# Patient Record
Sex: FEMALE | Race: WHITE | Employment: OTHER | URBAN - METROPOLITAN AREA
[De-identification: names, ages, dates, MRNs, and addresses within clinical notes are randomized per-mention and may not be internally consistent; named-entity substitution may affect disease eponyms.]

---

## 2017-01-05 ENCOUNTER — OFFICE VISIT (OUTPATIENT)
Dept: ENDOCRINOLOGY | Age: 67
End: 2017-01-05

## 2017-01-05 VITALS
DIASTOLIC BLOOD PRESSURE: 68 MMHG | WEIGHT: 269.6 LBS | RESPIRATION RATE: 16 BRPM | HEIGHT: 65 IN | HEART RATE: 72 BPM | BODY MASS INDEX: 44.92 KG/M2 | SYSTOLIC BLOOD PRESSURE: 130 MMHG

## 2017-01-05 DIAGNOSIS — I10 ESSENTIAL HYPERTENSION: Chronic | ICD-10-CM

## 2017-01-05 DIAGNOSIS — E78.49 OTHER HYPERLIPIDEMIA: ICD-10-CM

## 2017-01-05 PROCEDURE — 99214 OFFICE O/P EST MOD 30 MIN: CPT | Performed by: INTERNAL MEDICINE

## 2017-01-05 RX ORDER — GABAPENTIN 300 MG/1
CAPSULE ORAL
Qty: 30 CAPSULE | Refills: 3 | Status: SHIPPED | OUTPATIENT
Start: 2017-01-05 | End: 2017-04-13

## 2017-01-12 RX ORDER — BLOOD SUGAR DIAGNOSTIC
STRIP MISCELLANEOUS
Qty: 200 STRIP | Refills: 5 | Status: SHIPPED | OUTPATIENT
Start: 2017-01-12 | End: 2019-10-28 | Stop reason: SDUPTHER

## 2017-01-14 LAB
ESTIMATED AVERAGE GLUCOSE: 162.8 MG/DL
HBA1C MFR BLD: 7.3 %

## 2017-01-30 ENCOUNTER — OFFICE VISIT (OUTPATIENT)
Dept: INTERNAL MEDICINE CLINIC | Age: 67
End: 2017-01-30

## 2017-01-30 VITALS
RESPIRATION RATE: 16 BRPM | SYSTOLIC BLOOD PRESSURE: 132 MMHG | BODY MASS INDEX: 45.32 KG/M2 | DIASTOLIC BLOOD PRESSURE: 78 MMHG | WEIGHT: 272 LBS | HEIGHT: 65 IN | HEART RATE: 84 BPM | OXYGEN SATURATION: 93 %

## 2017-01-30 DIAGNOSIS — K57.92 ACUTE DIVERTICULITIS: Primary | ICD-10-CM

## 2017-01-30 PROCEDURE — 99214 OFFICE O/P EST MOD 30 MIN: CPT | Performed by: INTERNAL MEDICINE

## 2017-01-30 RX ORDER — METRONIDAZOLE 500 MG/1
500 TABLET ORAL 3 TIMES DAILY
Qty: 21 TABLET | Refills: 0 | Status: SHIPPED | OUTPATIENT
Start: 2017-01-30 | End: 2017-02-06

## 2017-01-30 RX ORDER — CIPROFLOXACIN 250 MG/1
250 TABLET, FILM COATED ORAL 2 TIMES DAILY
Qty: 14 TABLET | Refills: 0 | Status: SHIPPED | OUTPATIENT
Start: 2017-01-30 | End: 2017-02-06

## 2017-02-03 ENCOUNTER — TELEPHONE (OUTPATIENT)
Dept: INTERNAL MEDICINE CLINIC | Age: 67
End: 2017-02-03

## 2017-02-13 RX ORDER — SPIRONOLACTONE 25 MG/1
TABLET ORAL
Qty: 90 TABLET | Refills: 3 | Status: SHIPPED | OUTPATIENT
Start: 2017-02-13 | End: 2018-02-08 | Stop reason: SDUPTHER

## 2017-04-13 ENCOUNTER — OFFICE VISIT (OUTPATIENT)
Dept: ENDOCRINOLOGY | Age: 67
End: 2017-04-13

## 2017-04-13 VITALS
HEIGHT: 65 IN | RESPIRATION RATE: 16 BRPM | BODY MASS INDEX: 45.55 KG/M2 | WEIGHT: 273.4 LBS | OXYGEN SATURATION: 88 % | DIASTOLIC BLOOD PRESSURE: 76 MMHG | SYSTOLIC BLOOD PRESSURE: 133 MMHG | HEART RATE: 98 BPM

## 2017-04-13 DIAGNOSIS — I10 ESSENTIAL HYPERTENSION: Chronic | ICD-10-CM

## 2017-04-13 DIAGNOSIS — E78.49 OTHER HYPERLIPIDEMIA: ICD-10-CM

## 2017-04-13 LAB — HBA1C MFR BLD: 8.1 %

## 2017-04-13 PROCEDURE — 83036 HEMOGLOBIN GLYCOSYLATED A1C: CPT | Performed by: INTERNAL MEDICINE

## 2017-04-13 PROCEDURE — 99214 OFFICE O/P EST MOD 30 MIN: CPT | Performed by: INTERNAL MEDICINE

## 2017-05-08 RX ORDER — AMLODIPINE BESYLATE 5 MG/1
TABLET ORAL
Qty: 180 TABLET | Refills: 3 | Status: SHIPPED | OUTPATIENT
Start: 2017-05-08 | End: 2018-02-08 | Stop reason: SDUPTHER

## 2017-06-19 RX ORDER — SIMVASTATIN 20 MG
TABLET ORAL
Qty: 90 TABLET | Refills: 3 | Status: SHIPPED | OUTPATIENT
Start: 2017-06-19 | End: 2018-06-03 | Stop reason: SDUPTHER

## 2017-07-31 RX ORDER — INSULIN DETEMIR 100 [IU]/ML
INJECTION, SOLUTION SUBCUTANEOUS
Qty: 45 ML | Refills: 3 | Status: SHIPPED | OUTPATIENT
Start: 2017-07-31 | End: 2017-12-12

## 2017-08-03 ENCOUNTER — OFFICE VISIT (OUTPATIENT)
Dept: ENDOCRINOLOGY | Age: 67
End: 2017-08-03

## 2017-08-03 VITALS
DIASTOLIC BLOOD PRESSURE: 62 MMHG | BODY MASS INDEX: 41.42 KG/M2 | WEIGHT: 248.6 LBS | SYSTOLIC BLOOD PRESSURE: 128 MMHG | RESPIRATION RATE: 16 BRPM | HEIGHT: 65 IN | HEART RATE: 78 BPM

## 2017-08-03 DIAGNOSIS — I10 ESSENTIAL HYPERTENSION: Chronic | ICD-10-CM

## 2017-08-03 LAB — HBA1C MFR BLD: 5.5 %

## 2017-08-03 PROCEDURE — 99214 OFFICE O/P EST MOD 30 MIN: CPT | Performed by: INTERNAL MEDICINE

## 2017-08-03 PROCEDURE — 83036 HEMOGLOBIN GLYCOSYLATED A1C: CPT | Performed by: INTERNAL MEDICINE

## 2017-08-07 RX ORDER — ALLOPURINOL 100 MG/1
TABLET ORAL
Qty: 180 TABLET | Refills: 3 | Status: SHIPPED | OUTPATIENT
Start: 2017-08-07 | End: 2017-12-12

## 2017-08-10 ENCOUNTER — OFFICE VISIT (OUTPATIENT)
Dept: INTERNAL MEDICINE CLINIC | Age: 67
End: 2017-08-10

## 2017-08-10 VITALS
RESPIRATION RATE: 16 BRPM | BODY MASS INDEX: 41.32 KG/M2 | SYSTOLIC BLOOD PRESSURE: 108 MMHG | TEMPERATURE: 97.9 F | WEIGHT: 248 LBS | HEIGHT: 65 IN | DIASTOLIC BLOOD PRESSURE: 62 MMHG

## 2017-08-10 DIAGNOSIS — K57.92 ACUTE DIVERTICULITIS: Primary | ICD-10-CM

## 2017-08-10 PROCEDURE — 99214 OFFICE O/P EST MOD 30 MIN: CPT | Performed by: INTERNAL MEDICINE

## 2017-08-10 RX ORDER — METRONIDAZOLE 500 MG/1
500 TABLET ORAL 3 TIMES DAILY
Qty: 21 TABLET | Refills: 0 | Status: SHIPPED | OUTPATIENT
Start: 2017-08-10 | End: 2017-08-17

## 2017-08-10 RX ORDER — CIPROFLOXACIN 250 MG/1
250 TABLET, FILM COATED ORAL 2 TIMES DAILY
Qty: 14 TABLET | Refills: 0 | Status: SHIPPED | OUTPATIENT
Start: 2017-08-10 | End: 2017-08-17

## 2017-08-14 RX ORDER — GEMFIBROZIL 600 MG/1
TABLET, FILM COATED ORAL
Qty: 180 TABLET | Refills: 3 | Status: SHIPPED | OUTPATIENT
Start: 2017-08-14 | End: 2018-02-08 | Stop reason: SDUPTHER

## 2017-09-05 RX ORDER — LISINOPRIL 20 MG/1
TABLET ORAL
Qty: 180 TABLET | Refills: 3 | Status: SHIPPED | OUTPATIENT
Start: 2017-09-05 | End: 2018-09-05 | Stop reason: SDUPTHER

## 2017-09-05 RX ORDER — METFORMIN HYDROCHLORIDE 500 MG/1
TABLET, EXTENDED RELEASE ORAL
Qty: 360 TABLET | Refills: 3 | Status: SHIPPED | OUTPATIENT
Start: 2017-09-05 | End: 2018-02-08 | Stop reason: SDUPTHER

## 2017-09-22 LAB
CREATININE URINE: 48.7 MG/DL (ref 28–259)
MICROALBUMIN UR-MCNC: 1.7 MG/DL
MICROALBUMIN/CREAT UR-RTO: 34.9 MG/G (ref 0–30)

## 2017-11-01 ENCOUNTER — NURSE ONLY (OUTPATIENT)
Dept: INTERNAL MEDICINE CLINIC | Age: 67
End: 2017-11-01

## 2017-11-01 PROCEDURE — 90662 IIV NO PRSV INCREASED AG IM: CPT | Performed by: INTERNAL MEDICINE

## 2017-11-01 PROCEDURE — 90471 IMMUNIZATION ADMIN: CPT | Performed by: INTERNAL MEDICINE

## 2017-11-01 NOTE — PROGRESS NOTES
Vaccine Information Sheet, \"Influenza - Inactivated\"  given to Keo Gomez, or parent/legal guardian of  Keo Gomez and verbalized understanding. Patient responses:    Have you ever had a reaction to a flu vaccine? No  Are you able to eat eggs without adverse effects? Yes  Do you have any current illness? No  Have you ever had Guillian Menno Syndrome? No    Flu vaccine given per order. Please see immunization tab.

## 2017-12-05 RX ORDER — FUROSEMIDE 80 MG
TABLET ORAL
Qty: 180 TABLET | Refills: 0 | Status: SHIPPED | OUTPATIENT
Start: 2017-12-05 | End: 2017-12-12

## 2017-12-05 RX ORDER — INSULIN DETEMIR 100 [IU]/ML
INJECTION, SOLUTION SUBCUTANEOUS
Qty: 90 ML | Refills: 5 | Status: SHIPPED | OUTPATIENT
Start: 2017-12-05 | End: 2017-12-12

## 2017-12-06 ENCOUNTER — TELEPHONE (OUTPATIENT)
Dept: INTERNAL MEDICINE CLINIC | Age: 67
End: 2017-12-06

## 2017-12-07 ENCOUNTER — OFFICE VISIT (OUTPATIENT)
Dept: ENDOCRINOLOGY | Age: 67
End: 2017-12-07

## 2017-12-07 VITALS
HEART RATE: 84 BPM | BODY MASS INDEX: 40.2 KG/M2 | DIASTOLIC BLOOD PRESSURE: 70 MMHG | WEIGHT: 241.6 LBS | SYSTOLIC BLOOD PRESSURE: 128 MMHG

## 2017-12-07 DIAGNOSIS — E11.9 TYPE 2 DIABETES MELLITUS WITHOUT COMPLICATION, WITH LONG-TERM CURRENT USE OF INSULIN (HCC): Primary | Chronic | ICD-10-CM

## 2017-12-07 DIAGNOSIS — I10 ESSENTIAL HYPERTENSION: Chronic | ICD-10-CM

## 2017-12-07 DIAGNOSIS — Z79.4 TYPE 2 DIABETES MELLITUS WITHOUT COMPLICATION, WITH LONG-TERM CURRENT USE OF INSULIN (HCC): Primary | Chronic | ICD-10-CM

## 2017-12-07 PROCEDURE — 3017F COLORECTAL CA SCREEN DOC REV: CPT | Performed by: INTERNAL MEDICINE

## 2017-12-07 PROCEDURE — G8400 PT W/DXA NO RESULTS DOC: HCPCS | Performed by: INTERNAL MEDICINE

## 2017-12-07 PROCEDURE — G8427 DOCREV CUR MEDS BY ELIG CLIN: HCPCS | Performed by: INTERNAL MEDICINE

## 2017-12-07 PROCEDURE — G8417 CALC BMI ABV UP PARAM F/U: HCPCS | Performed by: INTERNAL MEDICINE

## 2017-12-07 PROCEDURE — 3044F HG A1C LEVEL LT 7.0%: CPT | Performed by: INTERNAL MEDICINE

## 2017-12-07 PROCEDURE — 1090F PRES/ABSN URINE INCON ASSESS: CPT | Performed by: INTERNAL MEDICINE

## 2017-12-07 PROCEDURE — 3014F SCREEN MAMMO DOC REV: CPT | Performed by: INTERNAL MEDICINE

## 2017-12-07 PROCEDURE — 99214 OFFICE O/P EST MOD 30 MIN: CPT | Performed by: INTERNAL MEDICINE

## 2017-12-07 PROCEDURE — 1036F TOBACCO NON-USER: CPT | Performed by: INTERNAL MEDICINE

## 2017-12-07 PROCEDURE — G8484 FLU IMMUNIZE NO ADMIN: HCPCS | Performed by: INTERNAL MEDICINE

## 2017-12-07 PROCEDURE — 1123F ACP DISCUSS/DSCN MKR DOCD: CPT | Performed by: INTERNAL MEDICINE

## 2017-12-07 PROCEDURE — 4040F PNEUMOC VAC/ADMIN/RCVD: CPT | Performed by: INTERNAL MEDICINE

## 2017-12-07 NOTE — PROGRESS NOTES
Seen as f/u patient for diabetes     Interim; Working on weight loss    Diagnosed with Type 2 diabetes mellitus > 5   years  Known diabetic complications: none     Current diabetic medications   Levemir 58 units BID  Metformin ER 1gm BID    Intolerance to diabetes medications: yes - Metformin -bad taste in mouth    Last A1c 5.5%<------- 8.1 on 4/17<------6.9 on 7/16<------- 6.9 on 4/16<-----5.9 on 1/16<----- 6.4 on 10/15<------ 6.9 on 6/15<-----5.9 on 2/15<----7.4 on 10/13<---- 8.8 on 5/13<---9.2<---9.6    Prior visit with dietician: No  Current diet: on average, 3 meals per day trying to watch  Current exercise: walking   Current monitoring regimen: home blood tests - 1-2 times daily     Has brought blood glucose log/meter:yes  Home blood sugar records:   Any episodes of hypoglycemia no    No Hx of CAD , PVD, CVA    Hyperlipidemia:simvastatin 20mg  LDL 74 on 3/14  Gemfibrozil 600 BID  Last eye exam: 1/14  Last foot exam: 1/17  Last microalbumin to creatinine ratio: 53.8 on 6/14---> 9/14 nl, 2/16 nl---> 34.9 on 9/17  norvasc 5 BID, lisinopril 20mg aldactone 25 clonidine 0.1 BID     Review of Systems  Constitutional: 7 lb weight loss and + malaise/fatigue. Negative for fever and chills.   See HPI       Objective:        /70   Pulse 84   Wt 241 lb 9.6 oz (109.6 kg)   BMI 40.20 kg/m²   Wt Readings from Last 3 Encounters:   12/07/17 241 lb 9.6 oz (109.6 kg)   08/10/17 248 lb (112.5 kg)   08/03/17 248 lb 9.6 oz (112.8 kg)     Constitutional: Well-developed, alert, appears stated age, cooperative, in no acute distress  H/E/N/M/T:atraumatic, normocephalic, external ears, nose, lips normal without lesions  Skeletal muscular: no kyphosis, no gross abnormalities  Skin: Xanthoma/Xanthelasmas are  not present  Psychiatric: Judgement and Insight:  judgement and insight appear normal  Neuro: Normal without focal findings, speech is spontaneous      1/17  Skeletal foot exam is normal, no skin lesions, toenails are

## 2017-12-12 ENCOUNTER — OFFICE VISIT (OUTPATIENT)
Dept: INTERNAL MEDICINE CLINIC | Age: 67
End: 2017-12-12

## 2017-12-12 VITALS
HEIGHT: 65 IN | OXYGEN SATURATION: 97 % | BODY MASS INDEX: 40.19 KG/M2 | DIASTOLIC BLOOD PRESSURE: 74 MMHG | WEIGHT: 241.2 LBS | RESPIRATION RATE: 16 BRPM | SYSTOLIC BLOOD PRESSURE: 128 MMHG | HEART RATE: 74 BPM

## 2017-12-12 DIAGNOSIS — E78.2 MIXED HYPERLIPIDEMIA: ICD-10-CM

## 2017-12-12 DIAGNOSIS — I10 ESSENTIAL HYPERTENSION: Chronic | ICD-10-CM

## 2017-12-12 DIAGNOSIS — E11.40 CONTROLLED TYPE 2 DIABETES MELLITUS WITH NEUROPATHY (HCC): Primary | ICD-10-CM

## 2017-12-12 PROCEDURE — G8400 PT W/DXA NO RESULTS DOC: HCPCS | Performed by: INTERNAL MEDICINE

## 2017-12-12 PROCEDURE — 4040F PNEUMOC VAC/ADMIN/RCVD: CPT | Performed by: INTERNAL MEDICINE

## 2017-12-12 PROCEDURE — 3017F COLORECTAL CA SCREEN DOC REV: CPT | Performed by: INTERNAL MEDICINE

## 2017-12-12 PROCEDURE — 3014F SCREEN MAMMO DOC REV: CPT | Performed by: INTERNAL MEDICINE

## 2017-12-12 PROCEDURE — 1123F ACP DISCUSS/DSCN MKR DOCD: CPT | Performed by: INTERNAL MEDICINE

## 2017-12-12 PROCEDURE — G8417 CALC BMI ABV UP PARAM F/U: HCPCS | Performed by: INTERNAL MEDICINE

## 2017-12-12 PROCEDURE — 1090F PRES/ABSN URINE INCON ASSESS: CPT | Performed by: INTERNAL MEDICINE

## 2017-12-12 PROCEDURE — 3044F HG A1C LEVEL LT 7.0%: CPT | Performed by: INTERNAL MEDICINE

## 2017-12-12 PROCEDURE — G8484 FLU IMMUNIZE NO ADMIN: HCPCS | Performed by: INTERNAL MEDICINE

## 2017-12-12 PROCEDURE — 90471 IMMUNIZATION ADMIN: CPT | Performed by: INTERNAL MEDICINE

## 2017-12-12 PROCEDURE — G8427 DOCREV CUR MEDS BY ELIG CLIN: HCPCS | Performed by: INTERNAL MEDICINE

## 2017-12-12 PROCEDURE — 99214 OFFICE O/P EST MOD 30 MIN: CPT | Performed by: INTERNAL MEDICINE

## 2017-12-12 PROCEDURE — 1036F TOBACCO NON-USER: CPT | Performed by: INTERNAL MEDICINE

## 2017-12-12 PROCEDURE — 90670 PCV13 VACCINE IM: CPT | Performed by: INTERNAL MEDICINE

## 2017-12-12 ASSESSMENT — PATIENT HEALTH QUESTIONNAIRE - PHQ9
2. FEELING DOWN, DEPRESSED OR HOPELESS: 0
1. LITTLE INTEREST OR PLEASURE IN DOING THINGS: 0
SUM OF ALL RESPONSES TO PHQ9 QUESTIONS 1 & 2: 0
SUM OF ALL RESPONSES TO PHQ QUESTIONS 1-9: 0

## 2017-12-12 ASSESSMENT — ENCOUNTER SYMPTOMS
RESPIRATORY NEGATIVE: 1
GASTROINTESTINAL NEGATIVE: 1

## 2017-12-22 DIAGNOSIS — E11.9 TYPE 2 DIABETES MELLITUS WITHOUT COMPLICATION, WITH LONG-TERM CURRENT USE OF INSULIN (HCC): Chronic | ICD-10-CM

## 2017-12-22 DIAGNOSIS — Z79.4 TYPE 2 DIABETES MELLITUS WITHOUT COMPLICATION, WITH LONG-TERM CURRENT USE OF INSULIN (HCC): Chronic | ICD-10-CM

## 2017-12-22 LAB
ESTIMATED AVERAGE GLUCOSE: 114 MG/DL
HBA1C MFR BLD: 5.6 %

## 2018-02-08 RX ORDER — METFORMIN HYDROCHLORIDE 500 MG/1
TABLET, EXTENDED RELEASE ORAL
Qty: 360 TABLET | Refills: 0 | Status: SHIPPED | OUTPATIENT
Start: 2018-02-08 | End: 2018-11-16 | Stop reason: SDUPTHER

## 2018-02-08 RX ORDER — SPIRONOLACTONE 25 MG/1
TABLET ORAL
Qty: 90 TABLET | Refills: 0 | Status: SHIPPED | OUTPATIENT
Start: 2018-02-08 | End: 2018-05-21 | Stop reason: SDUPTHER

## 2018-02-08 RX ORDER — AMLODIPINE BESYLATE 5 MG/1
TABLET ORAL
Qty: 180 TABLET | Refills: 0 | Status: SHIPPED | OUTPATIENT
Start: 2018-02-08 | End: 2018-05-14 | Stop reason: SDUPTHER

## 2018-02-08 RX ORDER — GEMFIBROZIL 600 MG/1
TABLET, FILM COATED ORAL
Qty: 180 TABLET | Refills: 0 | Status: SHIPPED | OUTPATIENT
Start: 2018-02-08 | End: 2018-11-07 | Stop reason: SDUPTHER

## 2018-03-09 RX ORDER — CLONIDINE HYDROCHLORIDE 0.1 MG/1
TABLET ORAL
Qty: 180 TABLET | Refills: 0 | Status: SHIPPED | OUTPATIENT
Start: 2018-03-09 | End: 2018-06-02 | Stop reason: SDUPTHER

## 2018-03-12 ENCOUNTER — TELEPHONE (OUTPATIENT)
Dept: INTERNAL MEDICINE CLINIC | Age: 68
End: 2018-03-12

## 2018-03-13 NOTE — TELEPHONE ENCOUNTER
Pt wants to know if Dr. Freya Moyer sent in a script to [facility]. Please call Rick

## 2018-03-14 NOTE — TELEPHONE ENCOUNTER
Please call her. Is this weakness related to joint pain or what? He doesn't realize how complex the insurance makes this. He feels that all we need to do is say she needs one and that makes it happen. Much more difficult than that and it's why we need to be precise. She may even need an appointment to discuss.

## 2018-03-14 NOTE — TELEPHONE ENCOUNTER
Spoke with Nargis Huber and she states she already has scooter (in the trunk of her car). Pt purchased the scooter, but is now getting billed for the taxes. Pt states that she can have a tax refund if a medical recommendation is obtained. Pt would like a recommendation from Dr Whit Cannon saying that she would benefit from the scooter since she has Middle/Low back pain (states she has discussed this with Dr Whit Cannon multiple times.)Pt states she has trouble walking and trouble standing for more than 5 minutes.

## 2018-04-12 ENCOUNTER — OFFICE VISIT (OUTPATIENT)
Dept: ENDOCRINOLOGY | Age: 68
End: 2018-04-12

## 2018-04-12 VITALS
BODY MASS INDEX: 43.28 KG/M2 | OXYGEN SATURATION: 93 % | DIASTOLIC BLOOD PRESSURE: 62 MMHG | HEART RATE: 78 BPM | RESPIRATION RATE: 16 BRPM | WEIGHT: 259.8 LBS | SYSTOLIC BLOOD PRESSURE: 130 MMHG | HEIGHT: 65 IN

## 2018-04-12 DIAGNOSIS — I10 ESSENTIAL HYPERTENSION: Chronic | ICD-10-CM

## 2018-04-12 DIAGNOSIS — E11.40 CONTROLLED TYPE 2 DIABETES MELLITUS WITH NEUROPATHY (HCC): Primary | ICD-10-CM

## 2018-04-12 LAB — HBA1C MFR BLD: 6.3 %

## 2018-04-12 PROCEDURE — 83036 HEMOGLOBIN GLYCOSYLATED A1C: CPT | Performed by: INTERNAL MEDICINE

## 2018-04-12 PROCEDURE — 99213 OFFICE O/P EST LOW 20 MIN: CPT | Performed by: INTERNAL MEDICINE

## 2018-05-14 RX ORDER — AMLODIPINE BESYLATE 5 MG/1
TABLET ORAL
Qty: 180 TABLET | Refills: 0 | Status: SHIPPED | OUTPATIENT
Start: 2018-05-14 | End: 2018-08-10 | Stop reason: SDUPTHER

## 2018-05-22 ENCOUNTER — TELEPHONE (OUTPATIENT)
Dept: INTERNAL MEDICINE CLINIC | Age: 68
End: 2018-05-22

## 2018-05-22 RX ORDER — SPIRONOLACTONE 25 MG/1
TABLET ORAL
Qty: 90 TABLET | Refills: 3 | Status: SHIPPED | OUTPATIENT
Start: 2018-05-22 | End: 2018-11-12 | Stop reason: SDUPTHER

## 2018-05-25 ENCOUNTER — NURSE ONLY (OUTPATIENT)
Dept: INTERNAL MEDICINE CLINIC | Age: 68
End: 2018-05-25

## 2018-05-25 DIAGNOSIS — I10 ESSENTIAL HYPERTENSION: Primary | Chronic | ICD-10-CM

## 2018-05-25 LAB
ANION GAP SERPL CALCULATED.3IONS-SCNC: 18 MMOL/L (ref 3–16)
BUN BLDV-MCNC: 53 MG/DL (ref 7–20)
CALCIUM SERPL-MCNC: 10.2 MG/DL (ref 8.3–10.6)
CHLORIDE BLD-SCNC: 106 MMOL/L (ref 99–110)
CO2: 22 MMOL/L (ref 21–32)
CREAT SERPL-MCNC: 1.1 MG/DL (ref 0.6–1.2)
GFR AFRICAN AMERICAN: >60
GFR NON-AFRICAN AMERICAN: 50
GLUCOSE BLD-MCNC: 148 MG/DL (ref 70–99)
POTASSIUM SERPL-SCNC: 4.9 MMOL/L (ref 3.5–5.1)
SODIUM BLD-SCNC: 146 MMOL/L (ref 136–145)

## 2018-05-25 PROCEDURE — 36415 COLL VENOUS BLD VENIPUNCTURE: CPT | Performed by: INTERNAL MEDICINE

## 2018-05-30 ENCOUNTER — TELEPHONE (OUTPATIENT)
Dept: INTERNAL MEDICINE CLINIC | Age: 68
End: 2018-05-30

## 2018-05-31 RX ORDER — FUROSEMIDE 20 MG/1
20 TABLET ORAL DAILY
Qty: 30 TABLET | Refills: 2 | Status: SHIPPED | OUTPATIENT
Start: 2018-05-31 | End: 2018-08-24 | Stop reason: SDUPTHER

## 2018-06-04 RX ORDER — CLONIDINE HYDROCHLORIDE 0.1 MG/1
TABLET ORAL
Qty: 180 TABLET | Refills: 3 | Status: SHIPPED | OUTPATIENT
Start: 2018-06-04 | End: 2019-06-11 | Stop reason: SDUPTHER

## 2018-06-04 RX ORDER — SIMVASTATIN 20 MG
TABLET ORAL
Qty: 90 TABLET | Refills: 3 | Status: SHIPPED | OUTPATIENT
Start: 2018-06-04 | End: 2018-11-29 | Stop reason: SDUPTHER

## 2018-08-09 RX ORDER — ALLOPURINOL 100 MG/1
TABLET ORAL
Qty: 180 TABLET | Refills: 3 | Status: SHIPPED | OUTPATIENT
Start: 2018-08-09 | End: 2019-08-05 | Stop reason: SDUPTHER

## 2018-08-10 RX ORDER — AMLODIPINE BESYLATE 5 MG/1
TABLET ORAL
Qty: 180 TABLET | Refills: 3 | Status: SHIPPED | OUTPATIENT
Start: 2018-08-10 | End: 2019-08-05 | Stop reason: SDUPTHER

## 2018-08-16 ENCOUNTER — OFFICE VISIT (OUTPATIENT)
Dept: ENDOCRINOLOGY | Age: 68
End: 2018-08-16

## 2018-08-16 VITALS
SYSTOLIC BLOOD PRESSURE: 128 MMHG | BODY MASS INDEX: 43.95 KG/M2 | WEIGHT: 263.8 LBS | HEIGHT: 65 IN | DIASTOLIC BLOOD PRESSURE: 78 MMHG

## 2018-08-16 DIAGNOSIS — I10 ESSENTIAL HYPERTENSION: Chronic | ICD-10-CM

## 2018-08-16 DIAGNOSIS — R80.9 MICROALBUMINURIA: ICD-10-CM

## 2018-08-16 DIAGNOSIS — E11.40 CONTROLLED TYPE 2 DIABETES MELLITUS WITH NEUROPATHY (HCC): Primary | ICD-10-CM

## 2018-08-16 LAB — HBA1C MFR BLD: 7.2 %

## 2018-08-16 PROCEDURE — 1123F ACP DISCUSS/DSCN MKR DOCD: CPT | Performed by: INTERNAL MEDICINE

## 2018-08-16 PROCEDURE — 1101F PT FALLS ASSESS-DOCD LE1/YR: CPT | Performed by: INTERNAL MEDICINE

## 2018-08-16 PROCEDURE — 3045F PR MOST RECENT HEMOGLOBIN A1C LEVEL 7.0-9.0%: CPT | Performed by: INTERNAL MEDICINE

## 2018-08-16 PROCEDURE — 1090F PRES/ABSN URINE INCON ASSESS: CPT | Performed by: INTERNAL MEDICINE

## 2018-08-16 PROCEDURE — 2022F DILAT RTA XM EVC RTNOPTHY: CPT | Performed by: INTERNAL MEDICINE

## 2018-08-16 PROCEDURE — 4040F PNEUMOC VAC/ADMIN/RCVD: CPT | Performed by: INTERNAL MEDICINE

## 2018-08-16 PROCEDURE — 3017F COLORECTAL CA SCREEN DOC REV: CPT | Performed by: INTERNAL MEDICINE

## 2018-08-16 PROCEDURE — G8400 PT W/DXA NO RESULTS DOC: HCPCS | Performed by: INTERNAL MEDICINE

## 2018-08-16 PROCEDURE — 99214 OFFICE O/P EST MOD 30 MIN: CPT | Performed by: INTERNAL MEDICINE

## 2018-08-16 PROCEDURE — G8417 CALC BMI ABV UP PARAM F/U: HCPCS | Performed by: INTERNAL MEDICINE

## 2018-08-16 PROCEDURE — 83036 HEMOGLOBIN GLYCOSYLATED A1C: CPT | Performed by: INTERNAL MEDICINE

## 2018-08-16 PROCEDURE — G8427 DOCREV CUR MEDS BY ELIG CLIN: HCPCS | Performed by: INTERNAL MEDICINE

## 2018-08-16 PROCEDURE — 1036F TOBACCO NON-USER: CPT | Performed by: INTERNAL MEDICINE

## 2018-08-24 RX ORDER — FUROSEMIDE 20 MG/1
TABLET ORAL
Qty: 30 TABLET | Refills: 5 | Status: SHIPPED | OUTPATIENT
Start: 2018-08-24 | End: 2018-11-13 | Stop reason: SDUPTHER

## 2018-09-05 RX ORDER — LISINOPRIL 20 MG/1
TABLET ORAL
Qty: 180 TABLET | Refills: 0 | Status: SHIPPED | OUTPATIENT
Start: 2018-09-05 | End: 2018-12-02 | Stop reason: SDUPTHER

## 2018-09-28 ENCOUNTER — NURSE ONLY (OUTPATIENT)
Dept: INTERNAL MEDICINE CLINIC | Age: 68
End: 2018-09-28
Payer: MEDICARE

## 2018-09-28 PROCEDURE — G0008 ADMIN INFLUENZA VIRUS VAC: HCPCS | Performed by: INTERNAL MEDICINE

## 2018-09-28 PROCEDURE — 90662 IIV NO PRSV INCREASED AG IM: CPT | Performed by: INTERNAL MEDICINE

## 2018-09-28 NOTE — PROGRESS NOTES
Vaccine Information Sheet, \"Influenza - Inactivated\"  given to Erickson Jiménez, or parent/legal guardian of  Erickson Jiménez and verbalized understanding. Patient responses:    Have you ever had a reaction to a flu vaccine? No  Are you able to eat eggs without adverse effects? Yes  Do you have any current illness? No  Have you ever had Guillian Gaines Syndrome? No    Flu vaccine given per order. Please see immunization tab.

## 2018-11-07 RX ORDER — GEMFIBROZIL 600 MG/1
TABLET, FILM COATED ORAL
Qty: 180 TABLET | Refills: 3 | Status: SHIPPED | OUTPATIENT
Start: 2018-11-07 | End: 2019-11-01 | Stop reason: SDUPTHER

## 2018-11-12 RX ORDER — SPIRONOLACTONE 25 MG/1
TABLET ORAL
Qty: 90 TABLET | Refills: 3 | Status: SHIPPED | OUTPATIENT
Start: 2018-11-12 | End: 2020-06-09 | Stop reason: SDUPTHER

## 2018-11-13 RX ORDER — FUROSEMIDE 20 MG/1
TABLET ORAL
Qty: 30 TABLET | Refills: 5 | Status: SHIPPED | OUTPATIENT
Start: 2018-11-13 | End: 2019-07-20 | Stop reason: SDUPTHER

## 2018-11-16 RX ORDER — METFORMIN HYDROCHLORIDE 500 MG/1
TABLET, EXTENDED RELEASE ORAL
Qty: 360 TABLET | Refills: 1 | Status: SHIPPED | OUTPATIENT
Start: 2018-11-16 | End: 2019-05-25 | Stop reason: SDUPTHER

## 2018-11-29 RX ORDER — SIMVASTATIN 20 MG
TABLET ORAL
Qty: 90 TABLET | Refills: 1 | Status: SHIPPED | OUTPATIENT
Start: 2018-11-29 | End: 2019-12-18 | Stop reason: SDUPTHER

## 2018-12-20 ENCOUNTER — OFFICE VISIT (OUTPATIENT)
Dept: ENDOCRINOLOGY | Age: 68
End: 2018-12-20
Payer: MEDICARE

## 2018-12-20 VITALS
SYSTOLIC BLOOD PRESSURE: 142 MMHG | BODY MASS INDEX: 44.98 KG/M2 | RESPIRATION RATE: 16 BRPM | WEIGHT: 270 LBS | DIASTOLIC BLOOD PRESSURE: 78 MMHG | OXYGEN SATURATION: 91 % | HEIGHT: 65 IN | HEART RATE: 81 BPM

## 2018-12-20 DIAGNOSIS — E11.40 CONTROLLED TYPE 2 DIABETES MELLITUS WITH NEUROPATHY (HCC): Primary | ICD-10-CM

## 2018-12-20 DIAGNOSIS — I10 ESSENTIAL HYPERTENSION: Chronic | ICD-10-CM

## 2018-12-20 DIAGNOSIS — R80.9 MICROALBUMINURIA: ICD-10-CM

## 2018-12-20 LAB
CREATININE URINE: 19.3 MG/DL (ref 28–259)
HBA1C MFR BLD: 7 %
MICROALBUMIN UR-MCNC: 2.7 MG/DL
MICROALBUMIN/CREAT UR-RTO: 139.9 MG/G (ref 0–30)

## 2018-12-20 PROCEDURE — 2022F DILAT RTA XM EVC RTNOPTHY: CPT | Performed by: INTERNAL MEDICINE

## 2018-12-20 PROCEDURE — G8400 PT W/DXA NO RESULTS DOC: HCPCS | Performed by: INTERNAL MEDICINE

## 2018-12-20 PROCEDURE — G8417 CALC BMI ABV UP PARAM F/U: HCPCS | Performed by: INTERNAL MEDICINE

## 2018-12-20 PROCEDURE — 1090F PRES/ABSN URINE INCON ASSESS: CPT | Performed by: INTERNAL MEDICINE

## 2018-12-20 PROCEDURE — G8482 FLU IMMUNIZE ORDER/ADMIN: HCPCS | Performed by: INTERNAL MEDICINE

## 2018-12-20 PROCEDURE — G8427 DOCREV CUR MEDS BY ELIG CLIN: HCPCS | Performed by: INTERNAL MEDICINE

## 2018-12-20 PROCEDURE — 1036F TOBACCO NON-USER: CPT | Performed by: INTERNAL MEDICINE

## 2018-12-20 PROCEDURE — 1101F PT FALLS ASSESS-DOCD LE1/YR: CPT | Performed by: INTERNAL MEDICINE

## 2018-12-20 PROCEDURE — 3017F COLORECTAL CA SCREEN DOC REV: CPT | Performed by: INTERNAL MEDICINE

## 2018-12-20 PROCEDURE — 4040F PNEUMOC VAC/ADMIN/RCVD: CPT | Performed by: INTERNAL MEDICINE

## 2018-12-20 PROCEDURE — 3045F PR MOST RECENT HEMOGLOBIN A1C LEVEL 7.0-9.0%: CPT | Performed by: INTERNAL MEDICINE

## 2018-12-20 PROCEDURE — 83036 HEMOGLOBIN GLYCOSYLATED A1C: CPT | Performed by: INTERNAL MEDICINE

## 2018-12-20 PROCEDURE — 99214 OFFICE O/P EST MOD 30 MIN: CPT | Performed by: INTERNAL MEDICINE

## 2018-12-20 PROCEDURE — 1123F ACP DISCUSS/DSCN MKR DOCD: CPT | Performed by: INTERNAL MEDICINE

## 2018-12-20 NOTE — PROGRESS NOTES
speech is spontaneous  Chest: No labored breathing, no chest deformity, no stridor  Musculoskeletal: No joint deformity, swelling    4/18  Skeletal foot exam is normal, no skin lesions, toenails are normal,10 g monofilament is 5/10 on the right and 3/10 on the left    Lab Reviewed   No components found for: CHLPL  Lab Results   Component Value Date    TRIG 130 10/02/2013    TRIG 156 (H) 05/01/2012     Lab Results   Component Value Date    HDL 41 10/02/2013    HDL 47 05/01/2012     Lab Results   Component Value Date    LDLCALC 75 10/02/2013    LDLCALC 76 05/01/2012     Lab Results   Component Value Date    LABVLDL 26 10/02/2013    LABVLDL 31 05/01/2012     Lab Results   Component Value Date    LABA1C 7.2 08/16/2018       Assessment:     Willard Danielle is a 80 y.o. female with :    1.T2DM:Longstanding, well controlled, insulin resistant. She was eating more CHO,  advised low CHo diet, continue levemir,may add GLP-1 agonist , restrict CHO. A1c better  2. HTN:Repeat improved  3. HLD:last LDL at goal  4. Obesity  5. Microalbuminuria: recheck slightly elevated  6. Neuropathy: states has pain at night,  started low dose neurontin but had side effect, discussed lyrica/amitryptiline, would like to wait       Plan:      levemir 50 unit BID    switch to NPH if levemir too costly for patient    metformin ER 1000mg BID   Advise to check blood sugar 2-3 times a day   Patient to send blood sugar log for titration. Advise to exercise regularly but can not due to hip pain,Advise to low simple carbohydrate and protein with each  meal diet. 30gm with meal   Diabetes Care: recommend yearly eye exam, foot exam and urine microalbumin to   creatinine ratio.  Patient is up-to-date.       -Hyperlipidemia, LDL goal is <100 mg/dl   -Hypertension:Continue same  -Daily ASA:Takes  -Smoking status:Non smoker    4 months

## 2018-12-21 ENCOUNTER — TELEPHONE (OUTPATIENT)
Dept: ENDOCRINOLOGY | Age: 68
End: 2018-12-21

## 2018-12-24 RX ORDER — INSULIN DETEMIR 100 [IU]/ML
INJECTION, SOLUTION SUBCUTANEOUS
Qty: 30 PEN | Refills: 5 | Status: SHIPPED | OUTPATIENT
Start: 2018-12-24 | End: 2019-12-30

## 2019-01-09 ENCOUNTER — OFFICE VISIT (OUTPATIENT)
Dept: INTERNAL MEDICINE CLINIC | Age: 69
End: 2019-01-09
Payer: MEDICARE

## 2019-01-09 VITALS
OXYGEN SATURATION: 94 % | BODY MASS INDEX: 43.43 KG/M2 | HEART RATE: 63 BPM | WEIGHT: 261 LBS | SYSTOLIC BLOOD PRESSURE: 138 MMHG | DIASTOLIC BLOOD PRESSURE: 88 MMHG | TEMPERATURE: 99.2 F

## 2019-01-09 DIAGNOSIS — J40 BRONCHITIS: Primary | ICD-10-CM

## 2019-01-09 PROCEDURE — 1123F ACP DISCUSS/DSCN MKR DOCD: CPT | Performed by: INTERNAL MEDICINE

## 2019-01-09 PROCEDURE — 3017F COLORECTAL CA SCREEN DOC REV: CPT | Performed by: INTERNAL MEDICINE

## 2019-01-09 PROCEDURE — G8417 CALC BMI ABV UP PARAM F/U: HCPCS | Performed by: INTERNAL MEDICINE

## 2019-01-09 PROCEDURE — 1090F PRES/ABSN URINE INCON ASSESS: CPT | Performed by: INTERNAL MEDICINE

## 2019-01-09 PROCEDURE — 4040F PNEUMOC VAC/ADMIN/RCVD: CPT | Performed by: INTERNAL MEDICINE

## 2019-01-09 PROCEDURE — G8482 FLU IMMUNIZE ORDER/ADMIN: HCPCS | Performed by: INTERNAL MEDICINE

## 2019-01-09 PROCEDURE — 1101F PT FALLS ASSESS-DOCD LE1/YR: CPT | Performed by: INTERNAL MEDICINE

## 2019-01-09 PROCEDURE — 1036F TOBACCO NON-USER: CPT | Performed by: INTERNAL MEDICINE

## 2019-01-09 PROCEDURE — G8400 PT W/DXA NO RESULTS DOC: HCPCS | Performed by: INTERNAL MEDICINE

## 2019-01-09 PROCEDURE — 99213 OFFICE O/P EST LOW 20 MIN: CPT | Performed by: INTERNAL MEDICINE

## 2019-01-09 PROCEDURE — G8427 DOCREV CUR MEDS BY ELIG CLIN: HCPCS | Performed by: INTERNAL MEDICINE

## 2019-01-09 RX ORDER — AZITHROMYCIN 250 MG/1
TABLET, FILM COATED ORAL
Qty: 1 PACKET | Refills: 0 | Status: SHIPPED | OUTPATIENT
Start: 2019-01-09 | End: 2019-12-05 | Stop reason: ALTCHOICE

## 2019-01-09 RX ORDER — PROMETHAZINE HYDROCHLORIDE 6.25 MG/5ML
6.25 SYRUP ORAL 4 TIMES DAILY PRN
Qty: 120 ML | Refills: 1 | Status: SHIPPED | OUTPATIENT
Start: 2019-01-09 | End: 2019-01-16

## 2019-01-09 RX ORDER — CIPROFLOXACIN HYDROCHLORIDE 3.5 MG/ML
SOLUTION/ DROPS TOPICAL
Qty: 120 DROP | Refills: 0 | Status: SHIPPED | OUTPATIENT
Start: 2019-01-09 | End: 2019-03-18

## 2019-01-09 ASSESSMENT — PATIENT HEALTH QUESTIONNAIRE - PHQ9
SUM OF ALL RESPONSES TO PHQ QUESTIONS 1-9: 0
SUM OF ALL RESPONSES TO PHQ9 QUESTIONS 1 & 2: 0
1. LITTLE INTEREST OR PLEASURE IN DOING THINGS: 0
SUM OF ALL RESPONSES TO PHQ QUESTIONS 1-9: 0
2. FEELING DOWN, DEPRESSED OR HOPELESS: 0

## 2019-01-09 ASSESSMENT — ENCOUNTER SYMPTOMS
SORE THROAT: 1
CHOKING: 1
COUGH: 1
SHORTNESS OF BREATH: 0

## 2019-02-27 RX ORDER — LISINOPRIL 20 MG/1
TABLET ORAL
Qty: 180 TABLET | Refills: 1 | Status: SHIPPED | OUTPATIENT
Start: 2019-02-27 | End: 2019-08-24 | Stop reason: SDUPTHER

## 2019-03-18 ENCOUNTER — INITIAL CONSULT (OUTPATIENT)
Dept: SURGERY | Age: 69
End: 2019-03-18
Payer: MEDICARE

## 2019-03-18 VITALS
BODY MASS INDEX: 43.43 KG/M2 | OXYGEN SATURATION: 94 % | HEART RATE: 83 BPM | DIASTOLIC BLOOD PRESSURE: 74 MMHG | WEIGHT: 261 LBS | SYSTOLIC BLOOD PRESSURE: 130 MMHG

## 2019-03-18 DIAGNOSIS — L02.413 ABSCESS OF RIGHT SHOULDER: Primary | ICD-10-CM

## 2019-03-18 PROCEDURE — 10060 I&D ABSCESS SIMPLE/SINGLE: CPT | Performed by: SURGERY

## 2019-03-18 PROCEDURE — 99999 PR OFFICE/OUTPT VISIT,PROCEDURE ONLY: CPT | Performed by: SURGERY

## 2019-03-18 RX ORDER — SULFAMETHOXAZOLE AND TRIMETHOPRIM 800; 160 MG/1; MG/1
1 TABLET ORAL 2 TIMES DAILY
Qty: 28 TABLET | Refills: 0 | Status: SHIPPED | OUTPATIENT
Start: 2019-03-18 | End: 2019-04-01

## 2019-03-23 LAB
ANAEROBIC CULTURE: ABNORMAL
GRAM STAIN RESULT: ABNORMAL
ORGANISM: ABNORMAL
ORGANISM: ABNORMAL
WOUND/ABSCESS: ABNORMAL

## 2019-03-29 ENCOUNTER — OFFICE VISIT (OUTPATIENT)
Dept: SURGERY | Age: 69
End: 2019-03-29
Payer: MEDICARE

## 2019-03-29 VITALS
HEART RATE: 67 BPM | DIASTOLIC BLOOD PRESSURE: 72 MMHG | WEIGHT: 256 LBS | SYSTOLIC BLOOD PRESSURE: 133 MMHG | BODY MASS INDEX: 42.6 KG/M2

## 2019-03-29 DIAGNOSIS — L02.212 ABSCESS OF BACK: Primary | ICD-10-CM

## 2019-03-29 PROCEDURE — 99999 PR OFFICE/OUTPT VISIT,PROCEDURE ONLY: CPT | Performed by: SURGERY

## 2019-03-29 PROCEDURE — 10060 I&D ABSCESS SIMPLE/SINGLE: CPT | Performed by: SURGERY

## 2019-03-29 RX ORDER — CEPHALEXIN 500 MG/1
500 CAPSULE ORAL 4 TIMES DAILY
Qty: 40 CAPSULE | Refills: 0 | Status: SHIPPED | OUTPATIENT
Start: 2019-03-29 | End: 2019-04-08

## 2019-03-31 LAB
GRAM STAIN RESULT: ABNORMAL
WOUND/ABSCESS: ABNORMAL

## 2019-04-09 ENCOUNTER — OFFICE VISIT (OUTPATIENT)
Dept: SURGERY | Age: 69
End: 2019-04-09

## 2019-04-09 VITALS
OXYGEN SATURATION: 95 % | HEART RATE: 68 BPM | DIASTOLIC BLOOD PRESSURE: 76 MMHG | WEIGHT: 256 LBS | HEIGHT: 65 IN | RESPIRATION RATE: 18 BRPM | BODY MASS INDEX: 42.65 KG/M2 | SYSTOLIC BLOOD PRESSURE: 138 MMHG

## 2019-04-09 DIAGNOSIS — L02.212 ABSCESS OF BACK: Primary | ICD-10-CM

## 2019-04-09 DIAGNOSIS — L02.413 ABSCESS OF RIGHT SHOULDER: ICD-10-CM

## 2019-04-09 PROCEDURE — 99024 POSTOP FOLLOW-UP VISIT: CPT | Performed by: SURGERY

## 2019-04-23 ENCOUNTER — TELEPHONE (OUTPATIENT)
Dept: ENDOCRINOLOGY | Age: 69
End: 2019-04-23

## 2019-04-25 NOTE — PROGRESS NOTES
Post Operative Visit    [address]  [facility]   David Curiel MD    [address], [address]  [place]  Phone: [phone]  Fax: [phone]    Timothy Delgado   YOB: 1950    Date of Visit:  4/9/2019    No ref. provider found  Santos Dempsey MD    Subjective:     John Bruner presents for a two week follow-up s/p I&D of right shoulder abscess and I&D of left lower back abscess. Overall she has been doing well since her last visit. Wounds have minimal drainage  No fevers or chills. Pain is improving.      Pain Score:   3    No Known Allergies  Outpatient Medications Marked as Taking for the 4/9/19 encounter (Office Visit) with Alicia Alicea MD   Medication Sig Dispense Refill    lisinopril (PRINIVIL;ZESTRIL) 20 MG tablet TAKE 1 TABLET BY MOUTH TWICE A  tablet 1    azithromycin (ZITHROMAX) 250 MG tablet Take 2 tabs (500 mg) on Day 1, and take 1 tab (250 mg) on days 2 through 5. 1 packet 0    LEVEMIR FLEXTOUCH 100 UNIT/ML injection pen USE 70 UNITS IN THE AM AND 40 UNITS IN THE PM 30 pen 5    simvastatin (ZOCOR) 20 MG tablet TAKE 1 TABLET BY MOUTH EVERY DAY 90 tablet 1    metFORMIN (GLUCOPHAGE-XR) 500 MG extended release tablet TAKE 2 TABLETS BY MOUTH TWICE A  tablet 1    furosemide (LASIX) 20 MG tablet TAKE 1 TABLET BY MOUTH EVERY DAY 30 tablet 5    spironolactone (ALDACTONE) 25 MG tablet TAKE 1 TABLET BY MOUTH EVERY DAY 90 tablet 3    gemfibrozil (LOPID) 600 MG tablet TAKE 1 TABLET BY MOUTH TWICE A  tablet 3    amLODIPine (NORVASC) 5 MG tablet TAKE 1 TABLET BY MOUTH TWICE A  tablet 3    allopurinol (ZYLOPRIM) 100 MG tablet TAKE 1 TABLET BY MOUTH TWICE A  tablet 3    cloNIDine (CATAPRES) 0.1 MG tablet TAKE 1 TABLET BY MOUTH TWICE A  tablet 3    Insulin Pen Needle (BD PEN NEEDLE ELIZABETH U/F) 32G X 4 MM MISC Use 4 timesdaily 400 each 4    ONETOUCH VERIO strip USE AS DIRECTED TWICE DAILY AS NEEDED 200 strip 5    ONE TOUCH ULTRASOFT LANCETS MISC 1 each by Does not apply route 2 times daily 200 each 3    Multiple Vitamins-Minerals (CENTRUM SILVER) TABS Take  by mouth daily.  aspirin 81 MG chewable tablet Take 81 mg by mouth daily. Vitals:    04/09/19 1152   BP: 138/76   Site: Right Upper Arm   Position: Sitting   Cuff Size: Large Adult   Pulse: 68   Resp: 18   SpO2: 95%   Weight: 256 lb (116.1 kg)   Height: 5' 5\" (1.651 m)     Body mass index is 42.6 kg/m². Wt Readings from Last 3 Encounters:   04/09/19 256 lb (116.1 kg)   03/29/19 256 lb (116.1 kg)   03/18/19 261 lb (118.4 kg)     BP Readings from Last 3 Encounters:   04/09/19 138/76   03/29/19 133/72   03/18/19 130/74          Objective:    CONSTITUTIONAL:  awake, alert, no apparent distress    LUNGS:  Resp easy and unlabored  MUSCULOSKELETAL: No edema  NEUROLOGIC:  Mental Status Exam:  Level of Alertness:   awake  Orientation:   person, place, time  SKIN: right should wound decreasing in size, granulation at base; left lower back with minimal drainage, no surrounding erythema      Microbiology:  Heavy growth propionobacterium    ASSESSMENT:     Diagnosis Orders   1. Abscess of back     2. Abscess of right shoulder         PLAN:    Steven Segura is doing well s/p I&D of her right shoulder and left lower back abscess sites. Continue local wound care. I do not think she needs additional antibiotics at this time. Will plan on having her follow up in 2-3 weeks to check her wounds. Continue local wound care with wet to dry dressings.     Electronically signed by Riley Mohs, MD

## 2019-04-30 ENCOUNTER — OFFICE VISIT (OUTPATIENT)
Dept: SURGERY | Age: 69
End: 2019-04-30

## 2019-04-30 VITALS
WEIGHT: 264 LBS | DIASTOLIC BLOOD PRESSURE: 77 MMHG | SYSTOLIC BLOOD PRESSURE: 131 MMHG | BODY MASS INDEX: 43.93 KG/M2 | HEART RATE: 77 BPM

## 2019-04-30 DIAGNOSIS — L02.413 ABSCESS OF RIGHT SHOULDER: ICD-10-CM

## 2019-04-30 DIAGNOSIS — L02.212 ABSCESS OF BACK: Primary | ICD-10-CM

## 2019-04-30 PROCEDURE — 99024 POSTOP FOLLOW-UP VISIT: CPT | Performed by: SURGERY

## 2019-04-30 NOTE — PROGRESS NOTES
400 each 4    ONETOUCH VERIO strip USE AS DIRECTED TWICE DAILY AS NEEDED 200 strip 5    ONE TOUCH ULTRASOFT LANCETS MISC 1 each by Does not apply route 2 times daily 200 each 3    Multiple Vitamins-Minerals (CENTRUM SILVER) TABS Take  by mouth daily.  aspirin 81 MG chewable tablet Take 81 mg by mouth daily. Vitals:    04/30/19 0901   BP: 131/77   Site: Left Upper Arm   Position: Sitting   Cuff Size: Large Adult   Pulse: 77   Weight: 264 lb (119.7 kg)     Body mass index is 43.93 kg/m². Wt Readings from Last 3 Encounters:   04/30/19 264 lb (119.7 kg)   04/09/19 256 lb (116.1 kg)   03/29/19 256 lb (116.1 kg)     BP Readings from Last 3 Encounters:   04/30/19 131/77   04/09/19 138/76   03/29/19 133/72          Objective:    CONSTITUTIONAL:  awake, alert, no apparent distress    LUNGS:  Resp easy and unlabored  MUSCULOSKELETAL: No edema  NEUROLOGIC:  Mental Status Exam:  Level of Alertness:   awake  Orientation:   person, place, time  SKIN: left lower back wound with 1 x 0.75 cm eschar, no surrounding erythema, no drainage  Right shoulder with 0.5 x 1 cm opening with hypergranulation tissue extending from it, minimal purulent fluid with compression inferiorly; granulation tissue debrided back to edges of wound, base of wound scraped, minimal bleeding, no local needed        ASSESSMENT:     Diagnosis Orders   1. Abscess of back     2. Abscess of right shoulder           PLAN:    Nati Martinez is doing well s/p I&D of right shoulder and left lower back. Continue with local wound care to right shoulder until healed. No additional antibiotics needed. Will plan on having her follow up in 3 weeks prn.       Electronically signed by Broderick Topete MD on 4/30/2019 at 9:19 AM

## 2019-04-30 NOTE — Clinical Note
Hi Dr. Catalina Leyden just saw Ms. Max Jain in the office for follow up of her right shoulder and left lower back abscesses. The left lower back site has healed. The right shoulder site had some granulation tissue that I debrided back, but otherwise is healing without infection. I am going to have her follow up with me in 3 weeks as needed. Thank you for allowing me to take care of Ms. Delgado with you. Nicolasa Elizabeth

## 2019-05-28 RX ORDER — METFORMIN HYDROCHLORIDE 500 MG/1
TABLET, EXTENDED RELEASE ORAL
Qty: 360 TABLET | Refills: 3 | Status: SHIPPED | OUTPATIENT
Start: 2019-05-28 | End: 2020-05-18

## 2019-05-28 RX ORDER — SPIRONOLACTONE 25 MG/1
TABLET ORAL
Qty: 90 TABLET | Refills: 3 | Status: SHIPPED | OUTPATIENT
Start: 2019-05-28 | End: 2019-12-05 | Stop reason: SDUPTHER

## 2019-06-11 RX ORDER — CLONIDINE HYDROCHLORIDE 0.1 MG/1
TABLET ORAL
Qty: 180 TABLET | Refills: 2 | Status: SHIPPED | OUTPATIENT
Start: 2019-06-11 | End: 2020-03-16 | Stop reason: SDUPTHER

## 2019-07-22 RX ORDER — FUROSEMIDE 20 MG/1
TABLET ORAL
Qty: 90 TABLET | Refills: 1 | Status: SHIPPED | OUTPATIENT
Start: 2019-07-22 | End: 2020-01-17

## 2019-08-05 RX ORDER — AMLODIPINE BESYLATE 5 MG/1
TABLET ORAL
Qty: 180 TABLET | Refills: 3 | Status: SHIPPED | OUTPATIENT
Start: 2019-08-05 | End: 2019-12-12

## 2019-08-05 RX ORDER — ALLOPURINOL 100 MG/1
TABLET ORAL
Qty: 180 TABLET | Refills: 3 | Status: SHIPPED | OUTPATIENT
Start: 2019-08-05 | End: 2020-01-10

## 2019-08-08 ENCOUNTER — OFFICE VISIT (OUTPATIENT)
Dept: ENDOCRINOLOGY | Age: 69
End: 2019-08-08
Payer: MEDICARE

## 2019-08-08 VITALS
SYSTOLIC BLOOD PRESSURE: 118 MMHG | HEIGHT: 65 IN | HEART RATE: 74 BPM | BODY MASS INDEX: 42.78 KG/M2 | DIASTOLIC BLOOD PRESSURE: 72 MMHG | OXYGEN SATURATION: 98 % | WEIGHT: 256.8 LBS

## 2019-08-08 DIAGNOSIS — I10 ESSENTIAL HYPERTENSION: ICD-10-CM

## 2019-08-08 DIAGNOSIS — R80.9 MICROALBUMINURIA: ICD-10-CM

## 2019-08-08 LAB — HBA1C MFR BLD: 6.7 %

## 2019-08-08 PROCEDURE — 83036 HEMOGLOBIN GLYCOSYLATED A1C: CPT | Performed by: INTERNAL MEDICINE

## 2019-08-08 PROCEDURE — G8427 DOCREV CUR MEDS BY ELIG CLIN: HCPCS | Performed by: INTERNAL MEDICINE

## 2019-08-08 PROCEDURE — G8417 CALC BMI ABV UP PARAM F/U: HCPCS | Performed by: INTERNAL MEDICINE

## 2019-08-08 PROCEDURE — 2022F DILAT RTA XM EVC RTNOPTHY: CPT | Performed by: INTERNAL MEDICINE

## 2019-08-08 PROCEDURE — 1123F ACP DISCUSS/DSCN MKR DOCD: CPT | Performed by: INTERNAL MEDICINE

## 2019-08-08 PROCEDURE — 3017F COLORECTAL CA SCREEN DOC REV: CPT | Performed by: INTERNAL MEDICINE

## 2019-08-08 PROCEDURE — 3044F HG A1C LEVEL LT 7.0%: CPT | Performed by: INTERNAL MEDICINE

## 2019-08-08 PROCEDURE — 4040F PNEUMOC VAC/ADMIN/RCVD: CPT | Performed by: INTERNAL MEDICINE

## 2019-08-08 PROCEDURE — 99214 OFFICE O/P EST MOD 30 MIN: CPT | Performed by: INTERNAL MEDICINE

## 2019-08-08 PROCEDURE — G8400 PT W/DXA NO RESULTS DOC: HCPCS | Performed by: INTERNAL MEDICINE

## 2019-08-08 PROCEDURE — 1090F PRES/ABSN URINE INCON ASSESS: CPT | Performed by: INTERNAL MEDICINE

## 2019-08-08 PROCEDURE — 1036F TOBACCO NON-USER: CPT | Performed by: INTERNAL MEDICINE

## 2019-08-08 NOTE — PROGRESS NOTES
(GLUCOPHAGE-XR) 500 MG extended release tablet TAKE 2 TABLETS BY MOUTH TWICE A  tablet 3    spironolactone (ALDACTONE) 25 MG tablet TAKE 1 TABLET BY MOUTH EVERY DAY 90 tablet 3    lisinopril (PRINIVIL;ZESTRIL) 20 MG tablet TAKE 1 TABLET BY MOUTH TWICE A  tablet 1    azithromycin (ZITHROMAX) 250 MG tablet Take 2 tabs (500 mg) on Day 1, and take 1 tab (250 mg) on days 2 through 5. 1 packet 0    LEVEMIR FLEXTOUCH 100 UNIT/ML injection pen USE 70 UNITS IN THE AM AND 40 UNITS IN THE PM 30 pen 5    simvastatin (ZOCOR) 20 MG tablet TAKE 1 TABLET BY MOUTH EVERY DAY 90 tablet 1    spironolactone (ALDACTONE) 25 MG tablet TAKE 1 TABLET BY MOUTH EVERY DAY 90 tablet 3    gemfibrozil (LOPID) 600 MG tablet TAKE 1 TABLET BY MOUTH TWICE A  tablet 3    Insulin Pen Needle (BD PEN NEEDLE ELIZABETH U/F) 32G X 4 MM MISC Use 4 timesdaily 400 each 4    ONETOUCH VERIO strip USE AS DIRECTED TWICE DAILY AS NEEDED 200 strip 5    ONE TOUCH ULTRASOFT LANCETS MISC 1 each by Does not apply route 2 times daily 200 each 3    Multiple Vitamins-Minerals (CENTRUM SILVER) TABS Take  by mouth daily.  aspirin 81 MG chewable tablet Take 81 mg by mouth daily. No current facility-administered medications for this visit.         Review of Systems  10 lb loss  Scanned, reviewed       Objective:        /72 (Site: Right Upper Arm, Position: Sitting, Cuff Size: Large Adult)   Pulse 74   Ht 5' 5\" (1.651 m)   Wt 256 lb 12.8 oz (116.5 kg)   LMP  (LMP Unknown)   SpO2 98%   BMI 42.73 kg/m²   Wt Readings from Last 3 Encounters:   08/08/19 256 lb 12.8 oz (116.5 kg)   04/30/19 264 lb (119.7 kg)   04/09/19 256 lb (116.1 kg)     Constitutional: Well-developed, appears stated age, cooperative, in no acute distress  H/E/N/M/T:atraumatic, normocephalic, external ears, nose, lips normal without lesions  Eyes: Lids, lashes, conjunctivae and sclerae normal, No proptosis, no redness  Neck: supple, symmetrical, no swelling  Skin:

## 2019-08-26 RX ORDER — LISINOPRIL 20 MG/1
TABLET ORAL
Qty: 180 TABLET | Refills: 1 | Status: SHIPPED | OUTPATIENT
Start: 2019-08-26 | End: 2019-12-09

## 2019-10-01 ENCOUNTER — NURSE ONLY (OUTPATIENT)
Dept: INTERNAL MEDICINE CLINIC | Age: 69
End: 2019-10-01
Payer: MEDICARE

## 2019-10-01 DIAGNOSIS — Z23 NEED FOR IMMUNIZATION AGAINST INFLUENZA: Primary | ICD-10-CM

## 2019-10-01 PROCEDURE — 90653 IIV ADJUVANT VACCINE IM: CPT | Performed by: INTERNAL MEDICINE

## 2019-10-01 PROCEDURE — G0008 ADMIN INFLUENZA VIRUS VAC: HCPCS | Performed by: INTERNAL MEDICINE

## 2019-10-28 ENCOUNTER — TELEPHONE (OUTPATIENT)
Dept: ENDOCRINOLOGY | Age: 69
End: 2019-10-28

## 2019-11-01 RX ORDER — GEMFIBROZIL 600 MG/1
TABLET, FILM COATED ORAL
Qty: 180 TABLET | Refills: 1 | Status: SHIPPED | OUTPATIENT
Start: 2019-11-01 | End: 2020-05-11 | Stop reason: SDUPTHER

## 2019-12-09 ENCOUNTER — TELEPHONE (OUTPATIENT)
Dept: ADMISSION | Age: 69
End: 2019-12-09

## 2019-12-09 DIAGNOSIS — I10 ESSENTIAL HYPERTENSION: Primary | Chronic | ICD-10-CM

## 2019-12-09 RX ORDER — LISINOPRIL 40 MG/1
40 TABLET ORAL DAILY
Qty: 30 TABLET | Refills: 5 | Status: SHIPPED | OUTPATIENT
Start: 2019-12-09 | End: 2020-06-30 | Stop reason: SDUPTHER

## 2019-12-12 ENCOUNTER — OFFICE VISIT (OUTPATIENT)
Dept: ENDOCRINOLOGY | Age: 69
End: 2019-12-12
Payer: MEDICARE

## 2019-12-12 VITALS
HEART RATE: 71 BPM | TEMPERATURE: 99.1 F | BODY MASS INDEX: 42.43 KG/M2 | SYSTOLIC BLOOD PRESSURE: 148 MMHG | DIASTOLIC BLOOD PRESSURE: 78 MMHG | WEIGHT: 255 LBS | OXYGEN SATURATION: 93 %

## 2019-12-12 DIAGNOSIS — E11.9 CONTROLLED TYPE 2 DIABETES MELLITUS WITHOUT COMPLICATION, WITH LONG-TERM CURRENT USE OF INSULIN (HCC): Primary | ICD-10-CM

## 2019-12-12 DIAGNOSIS — Z79.4 CONTROLLED TYPE 2 DIABETES MELLITUS WITHOUT COMPLICATION, WITH LONG-TERM CURRENT USE OF INSULIN (HCC): Primary | ICD-10-CM

## 2019-12-12 PROBLEM — K35.30 ACUTE APPENDICITIS WITH LOCALIZED PERITONITIS: Status: ACTIVE | Noted: 2017-11-12

## 2019-12-12 PROBLEM — H25.813 COMBINED FORM OF AGE-RELATED CATARACT, BOTH EYES: Status: ACTIVE | Noted: 2019-12-12

## 2019-12-12 PROBLEM — H40.012: Status: ACTIVE | Noted: 2019-12-12

## 2019-12-12 PROBLEM — K35.80 APPENDICITIS, ACUTE: Status: ACTIVE | Noted: 2017-11-11

## 2019-12-12 PROBLEM — H17.9 CORNEAL SCAR, LEFT EYE: Status: ACTIVE | Noted: 2019-12-12

## 2019-12-12 PROBLEM — H40.039 ANATOMICAL NARROW ANGLE: Status: ACTIVE | Noted: 2019-12-12

## 2019-12-12 PROBLEM — H40.033 ANATOMICAL NARROW ANGLE, BILATERAL: Status: ACTIVE | Noted: 2019-12-12

## 2019-12-12 PROCEDURE — 1036F TOBACCO NON-USER: CPT | Performed by: INTERNAL MEDICINE

## 2019-12-12 PROCEDURE — 4040F PNEUMOC VAC/ADMIN/RCVD: CPT | Performed by: INTERNAL MEDICINE

## 2019-12-12 PROCEDURE — G8427 DOCREV CUR MEDS BY ELIG CLIN: HCPCS | Performed by: INTERNAL MEDICINE

## 2019-12-12 PROCEDURE — 1123F ACP DISCUSS/DSCN MKR DOCD: CPT | Performed by: INTERNAL MEDICINE

## 2019-12-12 PROCEDURE — 1090F PRES/ABSN URINE INCON ASSESS: CPT | Performed by: INTERNAL MEDICINE

## 2019-12-12 PROCEDURE — G8417 CALC BMI ABV UP PARAM F/U: HCPCS | Performed by: INTERNAL MEDICINE

## 2019-12-12 PROCEDURE — 3044F HG A1C LEVEL LT 7.0%: CPT | Performed by: INTERNAL MEDICINE

## 2019-12-12 PROCEDURE — 3017F COLORECTAL CA SCREEN DOC REV: CPT | Performed by: INTERNAL MEDICINE

## 2019-12-12 PROCEDURE — G8400 PT W/DXA NO RESULTS DOC: HCPCS | Performed by: INTERNAL MEDICINE

## 2019-12-12 PROCEDURE — 2022F DILAT RTA XM EVC RTNOPTHY: CPT | Performed by: INTERNAL MEDICINE

## 2019-12-12 PROCEDURE — G8482 FLU IMMUNIZE ORDER/ADMIN: HCPCS | Performed by: INTERNAL MEDICINE

## 2019-12-12 PROCEDURE — 99214 OFFICE O/P EST MOD 30 MIN: CPT | Performed by: INTERNAL MEDICINE

## 2019-12-26 PROBLEM — N18.30 STAGE 3 CHRONIC KIDNEY DISEASE (HCC): Status: ACTIVE | Noted: 2019-12-26

## 2019-12-26 PROBLEM — K35.80 APPENDICITIS, ACUTE: Status: RESOLVED | Noted: 2017-11-11 | Resolved: 2019-12-26

## 2019-12-26 PROBLEM — K35.30 ACUTE APPENDICITIS WITH LOCALIZED PERITONITIS: Status: RESOLVED | Noted: 2017-11-12 | Resolved: 2019-12-26

## 2019-12-26 PROBLEM — H40.012: Status: RESOLVED | Noted: 2019-12-12 | Resolved: 2019-12-26

## 2019-12-30 RX ORDER — INSULIN DETEMIR 100 [IU]/ML
INJECTION, SOLUTION SUBCUTANEOUS
Qty: 30 PEN | Refills: 0 | Status: SHIPPED | OUTPATIENT
Start: 2019-12-30 | End: 2020-05-27 | Stop reason: SDUPTHER

## 2020-01-03 ENCOUNTER — HOSPITAL ENCOUNTER (OUTPATIENT)
Dept: WOMENS IMAGING | Age: 70
Discharge: HOME | End: 2020-01-03
Payer: MEDICARE

## 2020-01-03 PROCEDURE — 77080 DXA BONE DENSITY AXIAL: CPT

## 2020-01-17 RX ORDER — FUROSEMIDE 20 MG/1
TABLET ORAL
Qty: 90 TABLET | Refills: 1 | Status: SHIPPED | OUTPATIENT
Start: 2020-01-17 | End: 2020-07-06 | Stop reason: SDUPTHER

## 2020-02-04 NOTE — PROGRESS NOTES
C-Difficile admission screening and protocol:     * Admitted with diarrhea?no     *Prior history of C-Diff. In last 3 months?no     *Antibiotic use in the past 6-8 weeks? yes-UTI     *Prior hospitalization or nursing home in the last month?   no

## 2020-02-04 NOTE — PROGRESS NOTES
[address]  time___0945 per pt_________        Surgery time____1115________    Take the following medications with a sip of water: Follow your MD/Surgeons pre-procedure instructions regarding your medications    Do not eat or drink anything after 12:00 midnight except for your prep prior to your surgery. This includes water chewing gum, mints and ice chips. You may brush your teeth and gargle the morning of your surgery, but do not swallow the water     Please see your family doctor/pediatrician for a history and physical and/or concerning medications. Bring any test results/reports from your physicians office. If you are under the care of a heart doctor or specialist doctor, please be aware that you may be asked to them for clearance    You may be asked to stop blood thinners such as Coumadin, Plavix, Fragmin, Lovenox, etc., or any anti-inflammatories such as:  Aspirin, Ibuprofen, Advil, Naproxen prior to your surgery. We also ask that you stop any OTC medications such as fish oil, vitamin E, glucosamine, garlic, Multivitamins, COQ 10, etc.    We ask that you do not smoke 24 hours prior to surgery  We ask that you do not  drink any alcoholic beverages 24 hours prior to surgery     You must make arrangements for a responsible adult to take you home after your surgery. For your safety you will not be allowed to leave alone or drive yourself home. Your surgery will be cancelled if you do not have a ride home. Also for your safety, it is strongly suggested that someone stay with you the first 24 hours after your surgery. A parent or legal guardian must accompany a child scheduled for surgery and plan to stay at the hospital until the child is discharged. Please do not bring other children with you. For your comfort, please wear simple loose fitting clothing to the hospital.  Please do not bring valuables.     Do not wear any make-up or nail polish on your fingers or toes      For your safety, please do not wear any jewelry or body piercing's on the day of surgery. All jewelry must be removed. If you have dentures, they will be removed before going to operating room. For your convenience, we will provide you with a container. If you wear contact lenses or glasses, they will be removed, please bring a case for them. If you have a living will and a durable power of  for healthcare, please bring in a copy. As part of our patient safety program to minimize surgical site infections, we ask you to do the following:    · Please notify your surgeon if you develop any illness between         now and the  day of your surgery. · This includes a cough, cold, fever, sore throat, nausea,         or vomiting, and diarrhea, etc.  ·  Please notify your surgeon if you experience dizziness, shortness         of breath or blurred vision between now and the time of your surgery. Do not shave your operative site 96 hours prior to surgery. For face and neck surgery, men may use an electric razor 48 hours   prior to surgery. You may shower the night before surgery or the morning of   your surgery with an antibacterial soap. You will need to bring a photo ID and insurance card    [facility] has an onsite pharmacy, would you like to utilize our pharmacy     If you will be staying overnight and use a C-pap machine, please bring   your C-pap to hospital     Our goal is to provide you with excellent care, therefore, visitors will be limited to two(2) in the room at a time so that we may focus on providing this care for you. Please contact pre-admission testing if you have any further questions.                  [facility] phone number:  [phone] PAT fax number:  [phone]  Please note these are generalized instructions for all surgical cases, you may be provided with more specific instructions according to your surgery.

## 2020-02-11 ENCOUNTER — ANESTHESIA EVENT (OUTPATIENT)
Dept: ENDOSCOPY | Age: 70
End: 2020-02-11
Payer: MEDICARE

## 2020-02-12 ENCOUNTER — ANESTHESIA (OUTPATIENT)
Dept: ENDOSCOPY | Age: 70
End: 2020-02-12
Payer: MEDICARE

## 2020-02-12 ENCOUNTER — HOSPITAL ENCOUNTER (OUTPATIENT)
Age: 70
Setting detail: OUTPATIENT SURGERY
Discharge: HOME | End: 2020-02-12
Attending: INTERNAL MEDICINE | Admitting: INTERNAL MEDICINE
Payer: MEDICARE

## 2020-02-12 VITALS
RESPIRATION RATE: 22 BRPM | DIASTOLIC BLOOD PRESSURE: 65 MMHG | OXYGEN SATURATION: 97 % | SYSTOLIC BLOOD PRESSURE: 142 MMHG

## 2020-02-12 VITALS
OXYGEN SATURATION: 93 % | RESPIRATION RATE: 16 BRPM | SYSTOLIC BLOOD PRESSURE: 166 MMHG | HEART RATE: 69 BPM | BODY MASS INDEX: 42.81 KG/M2 | DIASTOLIC BLOOD PRESSURE: 67 MMHG | HEIGHT: 65 IN | WEIGHT: 256.95 LBS | TEMPERATURE: 97.3 F

## 2020-02-12 LAB
GLUCOSE BLD-MCNC: 145 MG/DL (ref 70–99)
GLUCOSE BLD-MCNC: 168 MG/DL (ref 70–99)
PERFORMED ON: ABNORMAL
PERFORMED ON: ABNORMAL

## 2020-02-12 PROCEDURE — 3700000000 HC ANESTHESIA ATTENDED CARE: Performed by: INTERNAL MEDICINE

## 2020-02-12 PROCEDURE — 88341 IMHCHEM/IMCYTCHM EA ADD ANTB: CPT

## 2020-02-12 PROCEDURE — 88305 TISSUE EXAM BY PATHOLOGIST: CPT

## 2020-02-12 PROCEDURE — 2500000003 HC RX 250 WO HCPCS

## 2020-02-12 PROCEDURE — 7100000000 HC PACU RECOVERY - FIRST 15 MIN: Performed by: INTERNAL MEDICINE

## 2020-02-12 PROCEDURE — 7100000001 HC PACU RECOVERY - ADDTL 15 MIN: Performed by: INTERNAL MEDICINE

## 2020-02-12 PROCEDURE — 3700000001 HC ADD 15 MINUTES (ANESTHESIA): Performed by: INTERNAL MEDICINE

## 2020-02-12 PROCEDURE — 6360000002 HC RX W HCPCS

## 2020-02-12 PROCEDURE — 2580000003 HC RX 258: Performed by: ANESTHESIOLOGY

## 2020-02-12 PROCEDURE — 7100000010 HC PHASE II RECOVERY - FIRST 15 MIN: Performed by: INTERNAL MEDICINE

## 2020-02-12 PROCEDURE — 7100000011 HC PHASE II RECOVERY - ADDTL 15 MIN: Performed by: INTERNAL MEDICINE

## 2020-02-12 PROCEDURE — 2709999900 HC NON-CHARGEABLE SUPPLY: Performed by: INTERNAL MEDICINE

## 2020-02-12 PROCEDURE — 88342 IMHCHEM/IMCYTCHM 1ST ANTB: CPT

## 2020-02-12 PROCEDURE — 3609010600 HC COLONOSCOPY POLYPECTOMY SNARE/COLD BIOPSY: Performed by: INTERNAL MEDICINE

## 2020-02-12 RX ORDER — PROPOFOL 10 MG/ML
INJECTION, EMULSION INTRAVENOUS CONTINUOUS PRN
Status: DISCONTINUED | OUTPATIENT
Start: 2020-02-12 | End: 2020-02-12 | Stop reason: SDUPTHER

## 2020-02-12 RX ORDER — SODIUM CHLORIDE 0.9 % (FLUSH) 0.9 %
10 SYRINGE (ML) INJECTION EVERY 12 HOURS SCHEDULED
Status: DISCONTINUED | OUTPATIENT
Start: 2020-02-12 | End: 2020-02-12 | Stop reason: HOSPADM

## 2020-02-12 RX ORDER — LIDOCAINE HYDROCHLORIDE 20 MG/ML
INJECTION, SOLUTION EPIDURAL; INFILTRATION; INTRACAUDAL; PERINEURAL PRN
Status: DISCONTINUED | OUTPATIENT
Start: 2020-02-12 | End: 2020-02-12 | Stop reason: SDUPTHER

## 2020-02-12 RX ORDER — PROPOFOL 10 MG/ML
INJECTION, EMULSION INTRAVENOUS PRN
Status: DISCONTINUED | OUTPATIENT
Start: 2020-02-12 | End: 2020-02-12 | Stop reason: SDUPTHER

## 2020-02-12 RX ORDER — SODIUM CHLORIDE 0.9 % (FLUSH) 0.9 %
10 SYRINGE (ML) INJECTION PRN
Status: DISCONTINUED | OUTPATIENT
Start: 2020-02-12 | End: 2020-02-12 | Stop reason: HOSPADM

## 2020-02-12 RX ORDER — SODIUM CHLORIDE 9 MG/ML
INJECTION, SOLUTION INTRAVENOUS CONTINUOUS
Status: DISCONTINUED | OUTPATIENT
Start: 2020-02-12 | End: 2020-02-12 | Stop reason: HOSPADM

## 2020-02-12 RX ADMIN — LIDOCAINE HYDROCHLORIDE 100 MG: 20 INJECTION, SOLUTION EPIDURAL; INFILTRATION; INTRACAUDAL; PERINEURAL at 12:15

## 2020-02-12 RX ADMIN — PROPOFOL 50 MG: 10 INJECTION, EMULSION INTRAVENOUS at 12:19

## 2020-02-12 RX ADMIN — SODIUM CHLORIDE: 9 INJECTION, SOLUTION INTRAVENOUS at 10:06

## 2020-02-12 RX ADMIN — PROPOFOL 50 MG: 10 INJECTION, EMULSION INTRAVENOUS at 12:15

## 2020-02-12 RX ADMIN — PROPOFOL 140 MCG/KG/MIN: 10 INJECTION, EMULSION INTRAVENOUS at 12:15

## 2020-02-12 ASSESSMENT — PULMONARY FUNCTION TESTS
PIF_VALUE: 0

## 2020-02-12 ASSESSMENT — PAIN SCALES - GENERAL
PAINLEVEL_OUTOF10: 0

## 2020-02-12 ASSESSMENT — ENCOUNTER SYMPTOMS: SHORTNESS OF BREATH: 0

## 2020-02-12 ASSESSMENT — PAIN - FUNCTIONAL ASSESSMENT: PAIN_FUNCTIONAL_ASSESSMENT: 0-10

## 2020-02-12 NOTE — ANESTHESIA PRE PROCEDURE
Department of Anesthesiology  Preprocedure Note       Name:  Latrice Acosta   Age:  77 y.o.  :  1950                                          MRN:  [id number]         Date:  2020      Surgeon: Octavia Mascorro):  Hernan Borja MD    Procedure: COLONOSCOPY (N/A )    Medications prior to admission:   Prior to Admission medications    Medication Sig Start Date End Date Taking? Authorizing Provider   furosemide (LASIX) 20 MG tablet TAKE 1 TABLET BY MOUTH EVERY DAY 20  Yes Oren Pagan DO   allopurinol (ZYLOPRIM) 300 MG tablet Take 1 tablet by mouth daily 1/10/20 7/8/20 Yes Oren Pagan DO   LEVEMIR FLEXTOUCH 100 UNIT/ML injection pen USE 70 UNITS IN THE AM AND 40 UNITS IN THE PM  Patient taking differently: 50 Units 2 times daily  19  Yes Beto Parker MD   simvastatin (ZOCOR) 20 MG tablet TAKE 1 TABLET BY MOUTH EVERY DAY 19  Yes SELINA Daniels - CNP   lisinopril (PRINIVIL;ZESTRIL) 40 MG tablet Take 1 tablet by mouth daily 19 Yes Oren Pagan DO   gabapentin (NEURONTIN) 100 MG capsule Take 1 capsule by mouth nightly for 180 days. Intended supply: 30 days 19 Yes Oren Pagan DO   gemfibrozil (LOPID) 600 MG tablet TAKE 1 TABLET BY MOUTH TWICE A DAY 19  Yes Yael Carreon MD   cloNIDine (CATAPRES) 0.1 MG tablet TAKE 1 TABLET BY MOUTH TWICE A DAY 19  Yes Yael Carreon MD   metFORMIN (GLUCOPHAGE-XR) 500 MG extended release tablet TAKE 2 TABLETS BY MOUTH TWICE A DAY 19  Yes Mague Lozano MD   spironolactone (ALDACTONE) 25 MG tablet TAKE 1 TABLET BY MOUTH EVERY DAY 18  Yes Yael Carreon MD   Multiple Vitamins-Minerals (CENTRUM SILVER) TABS Take  by mouth daily. Yes Historical Provider, MD   aspirin 81 MG chewable tablet Take 81 mg by mouth daily.      Yes Historical Provider, MD   Insulin Pen Needle (BD PEN NEEDLE ELIZABETH U/F) 32G X 4 MM MISC Use 4 timesdaily 19   Mague Lozano MD   blood glucose test strips (ONETOUCH VERIO) strip USE AS DIRECTED TWICE DAILY AS NEEDED.  DIAGNOSIS CODE E 11.40 10/28/19   Celso Aranda MD   ONE TOUCH ULTRASOFT LANCETS MISC 1 each by Does not apply route 2 times daily 12/9/15   Kisha Baptiste MD       Current medications:    Current Facility-Administered Medications   Medication Dose Route Frequency Provider Last Rate Last Dose    0.9 % sodium chloride infusion   Intravenous Continuous Blu Neumann MD 75 mL/hr at 02/12/20 1006      sodium chloride flush 0.9 % injection 10 mL  10 mL Intravenous 2 times per day Blu Neumann MD        sodium chloride flush 0.9 % injection 10 mL  10 mL Intravenous PRN Blu Neumann MD           Allergies:  No Known Allergies    Problem List:    Patient Active Problem List   Diagnosis Code    Essential hypertension I10    DJD (degenerative joint disease) M19.90    Diverticulitis K57.92    Hyperlipidemia E78.5    Edema R60.9    Obesity E66.9    Controlled type 2 diabetes mellitus with neuropathy ([address] E11.40    Mixed hyperlipidemia E78.2    Sacroiliitis, not elsewhere classified ([address] M46.1    Long term (current) use of insulin ([address] Z79.4    Diffuse cystic mastopathy of both breasts N60.12, N60.11    Corneal scar, 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    Combined form of age-related cataract, both eyes H25.813    Anatomical narrow angle, bilateral H40.033    Anatomical narrow angle H40.039    Stage 3 chronic kidney disease ([address] N18.3       Past Medical History:        Diagnosis Date    Arthritis     Chronic kidney disease (CKD) stage G3a/A3, moderately decreased glomerular filtration rate (GFR) between 45-59 mL/min/1.73 square meter and albuminuria creatinine ratio greater than 300 mg/g ([address]     Diverticulitis     Essential hypertension 7/1/2011    Hyperlipidemia     Neuropathy     PONV (postoperative nausea and vomiting)     only hysterectomy    Type 2 diabetes mellitus without complication ([address] 3/0/3492       Past Surgical History: Procedure Laterality Date    [facility]EN[facility]CT[facility]  11/12/2017    [facility]    CHOLECYSTECTOMY      COLONOSCOPY      x`s 2-3    HYSTERECTOMY, TOTAL ABDOMINAL         Social History:    Social History     Tobacco Use    Smoking status: Never Smoker    Smokeless tobacco: Never Used   Substance Use Topics    Alcohol use: No                                Counseling given: Not Answered      Vital Signs (Current):   Vitals:    02/04/20 1144 02/12/20 0945   BP:  (!) 191/74   Pulse:  93   Resp:  18   Temp:  99 °F (37.2 °C)   TempSrc:  Temporal   SpO2:  93%   Weight: 260 lb (117.9 kg) 256 lb 15.1 oz (116.6 kg)   Height: 5' 5\" (1.651 m) 5' 5\" (1.651 m)                                              BP Readings from Last 3 Encounters:   02/12/20 (!) 191/74   01/09/20 138/85   12/26/19 (!) 152/86       NPO Status: Time of last liquid consumption: 0415                        Time of last solid consumption: 1900                        Date of last liquid consumption: 02/12/20                        Date of last solid food consumption: 02/10/20    BMI:   Wt Readings from Last 3 Encounters:   02/12/20 256 lb 15.1 oz (116.6 kg)   01/09/20 261 lb (118.4 kg)   12/26/19 263 lb (119.3 kg)     Body mass index is 42.76 kg/m².     CBC:   Lab Results   Component Value Date    WBC 7.7 12/05/2019    RBC 4.53 12/05/2019    HGB 13.9 12/05/2019    HCT 42.5 12/05/2019    MCV 93.7 12/05/2019    RDW 17.3 12/05/2019     12/05/2019       CMP:   Lab Results   Component Value Date     12/05/2019    K 4.4 12/05/2019     12/05/2019    CO2 21 12/05/2019    BUN 37 12/05/2019    CREATININE 1.1 12/05/2019    GFRAA 60 12/05/2019    GFRAA >60 04/22/2013    AGRATIO 1.7 12/05/2019    LABGLOM 49 12/05/2019    LABGLOM 46.0 06/29/2011    GLUCOSE 112 12/05/2019    GLUCOSE 139 06/29/2011    PROT 7.1 12/05/2019    CALCIUM 10.3 12/05/2019    BILITOT 0.3 12/05/2019    ALKPHOS 91 12/05/2019    AST 18 12/05/2019    ALT 15 12/05/2019       POC Tests:   Recent

## 2020-02-12 NOTE — OP NOTE
[address] [place] [address]  Endoscopy Note    Patient: Latrice Acosta  : 1950  Acct#:     Procedure: Colonoscopy with polypectomy    Date:  2020    Surgeon:  Hernan Borja MD    Referring Physician:  Dr. Maria Neither    Anesthesia:  TIVA    Indications: This is a 77y.o. year old female who presents today with  History of adenomatous colon polyps in 2015 here for surveillance. Had a 1cm polyp in 2015 and smaller polyps as well. Previous Colonoscopy: YES  Date:   Greater than 3 years: YES      Procedure: An informed consent was obtained from the patient after explanation of indications, benefits, possible risks and complications of the procedure. The patient was then taken to the endoscopy suite, placed in the left lateral decubitus position, and the above IV anesthesia was administered. A digital rectal examination was performed and revealed negative without mass, lesions or tenderness. The Olympus pediatric video colonoscope was placed in the patient's rectum under digital direction and advanced to the cecum. The cecum was identified by characteristic anatomy and ballottment. The prep was good. The ileocecal valve was identified. The ascending colon was examined twice to assure no sessile polyps missed. The scope was then withdrawn back through the cecum, ascending, transverse, descending and sigmoid colons. Carefull circumferential examination of the mucosa in these areas was performed. The scope was then withdrawn into the rectum and retroflexed. The scope was straightened, the colon was decompressed and the scope was withdrawn from the patient. Findings:  1. A 9mm polyp was identified in the proximal transverse colon and removed completely with hot snare polypectomy down to a clean base confirmed by post-polypectomy photograph. All polyp tissue sent off for pathologic evaluation.   A 7mm pedunculated polyp was identified in the proximal transverse colon  and removed completely with hot snare polypectomy down to a clean base confirmed by post-polypectomy photograph. All polyp tissue sent off for pathologic evaluation. An 8mm pedunculated polyp was identified in the proximal transverse colon  and removed completely with hot snare polypectomy down to a clean base confirmed by post-polypectomy photograph. All polyp tissue sent off for pathologic evaluation. A 3mm  polyp was identified in the proximal transverse colon  and removed completely with cold snare polypectomy down to a clean base confirmed by post-polypectomy photograph. All polyp tissue sent off for pathologic evaluation. A 5mm polyp was identified in the proximal transverse colon and removed completely with cold snare polypectomy down to a clean base confirmed by post-polypectomy photograph. All polyp tissue sent off for pathologic evaluation. A 6mm polyp was identified in the sigmoid colon and removed completely with cold snare polypectomy down to a clean base confirmed by post-polypectomy photograph. All polyp tissue sent off for pathologic evaluation. There was left-sided diverticulosis. The patient tolerated the procedure well and was taken to Recovery in good condition. No complications. EBL: minimal  Specimens taken: yes      Impression:   6 colon polyps removed  Left-sided diverticulosis. Grade 1 internal hemorrhoids    Recommendations:   1. Clear liquid diet, advance as tolerated. 2.  Repeat colonoscopy in 3 years based on polyps today. 3.  Follow up with Dr. Yue Rausch and with me as needed in the interim. 4.  She will try daily miralax for the constipation. If no benefit, will treat with linzess 145mcg daily. 5.  Call [phone] for biopsy results in 1 week.     Najma Mendoza MD   [address]  2/12/2020

## 2020-05-18 RX ORDER — METFORMIN HYDROCHLORIDE 500 MG/1
TABLET, EXTENDED RELEASE ORAL
Qty: 360 TABLET | Refills: 3 | Status: SHIPPED | OUTPATIENT
Start: 2020-05-18 | End: 2021-05-24

## 2020-05-18 NOTE — TELEPHONE ENCOUNTER
Medication:   Requested Prescriptions     Pending Prescriptions Disp Refills    metFORMIN (GLUCOPHAGE-XR) 500 MG extended release tablet [Pharmacy Med Name: METFORMIN HCL  MG TABLET] 360 tablet 3     Sig: TAKE 2 TABLETS BY MOUTH TWICE A DAY         Last appt: 12/12/2019  Next appt: 07/16/2020    Last OARRS: No flowsheet data found.

## 2020-05-27 ENCOUNTER — TELEPHONE (OUTPATIENT)
Dept: ENDOCRINOLOGY | Age: 70
End: 2020-05-27

## 2020-05-27 NOTE — TELEPHONE ENCOUNTER
Medication:   Requested Prescriptions     Pending Prescriptions Disp Refills    insulin detemir (LEVEMIR FLEXTOUCH) 100 UNIT/ML injection pen       Si Units 2 times daily         Last appt: 2019  Next appt:2020    Last OARRS: No flowsheet data found.

## 2020-05-27 NOTE — TELEPHONE ENCOUNTER
Pt used to have another doctor who prescribed her levemere and now hes retired so she needs Dr. Giuseppe Tyelr to sent into [facility]

## 2020-05-28 RX ORDER — INSULIN DETEMIR 100 [IU]/ML
50 INJECTION, SOLUTION SUBCUTANEOUS 2 TIMES DAILY
Qty: 30 ML | Refills: 1 | Status: SHIPPED | OUTPATIENT
Start: 2020-05-28 | End: 2020-06-22

## 2020-06-22 RX ORDER — INSULIN DETEMIR 100 [IU]/ML
50 INJECTION, SOLUTION SUBCUTANEOUS 2 TIMES DAILY
Qty: 5 PEN | Refills: 1 | Status: SHIPPED | OUTPATIENT
Start: 2020-06-22 | End: 2020-07-24

## 2020-07-24 RX ORDER — INSULIN DETEMIR 100 [IU]/ML
50 INJECTION, SOLUTION SUBCUTANEOUS 2 TIMES DAILY
Qty: 5 PEN | Refills: 0 | Status: SHIPPED | OUTPATIENT
Start: 2020-07-24 | End: 2020-07-30

## 2020-07-28 ENCOUNTER — OFFICE VISIT (OUTPATIENT)
Dept: ENDOCRINOLOGY | Age: 70
End: 2020-07-28
Payer: MEDICARE

## 2020-07-28 PROCEDURE — 3046F HEMOGLOBIN A1C LEVEL >9.0%: CPT | Performed by: INTERNAL MEDICINE

## 2020-07-28 PROCEDURE — 1123F ACP DISCUSS/DSCN MKR DOCD: CPT | Performed by: INTERNAL MEDICINE

## 2020-07-28 PROCEDURE — 1090F PRES/ABSN URINE INCON ASSESS: CPT | Performed by: INTERNAL MEDICINE

## 2020-07-28 PROCEDURE — 99213 OFFICE O/P EST LOW 20 MIN: CPT | Performed by: INTERNAL MEDICINE

## 2020-07-28 PROCEDURE — G8427 DOCREV CUR MEDS BY ELIG CLIN: HCPCS | Performed by: INTERNAL MEDICINE

## 2020-07-28 PROCEDURE — 2022F DILAT RTA XM EVC RTNOPTHY: CPT | Performed by: INTERNAL MEDICINE

## 2020-07-28 PROCEDURE — 3017F COLORECTAL CA SCREEN DOC REV: CPT | Performed by: INTERNAL MEDICINE

## 2020-07-28 PROCEDURE — G8399 PT W/DXA RESULTS DOCUMENT: HCPCS | Performed by: INTERNAL MEDICINE

## 2020-07-28 PROCEDURE — 4040F PNEUMOC VAC/ADMIN/RCVD: CPT | Performed by: INTERNAL MEDICINE

## 2020-07-28 NOTE — PROGRESS NOTES
Date    Arthritis     Chronic kidney disease (CKD) stage G3a/A3, moderately decreased glomerular filtration rate (GFR) between 45-59 mL/min/1.73 square meter and albuminuria creatinine ratio greater than 300 mg/g (HCC)     Diverticulitis     Essential hypertension 7/1/2011    Hyperlipidemia     Neuropathy     PONV (postoperative nausea and vomiting)     only hysterectomy    Type 2 diabetes mellitus without complication ([address] 8/2/5174     Past Surgical History:   Procedure Laterality Date    APPENDECTOMY  11/12/2017    [facility] [address]    CHOLECYSTECTOMY      COLONOSCOPY      x`s 2-3    COLONOSCOPY N/A 2/12/2020    Dr. Flavia Lazar, 6 polyps removed, repeat in 2023    HYSTERECTOMY, TOTAL ABDOMINAL       Current Outpatient Medications   Medication Sig Dispense Refill    LEVEMIR FLEXTOUCH 100 UNIT/ML injection pen INJECT 50 UNITS INTO THE SKIN 2 TIMES DAILY 5 pen 0    furosemide (LASIX) 20 MG tablet TAKE 1 TABLET BY MOUTH EVERY DAY 90 tablet 1    allopurinol (ZYLOPRIM) 300 MG tablet Take 1 tablet by mouth daily 90 tablet 1    lisinopril (PRINIVIL;ZESTRIL) 40 MG tablet Take 1 tablet by mouth daily 30 tablet 5    gabapentin (NEURONTIN) 100 MG capsule Take 2 capsules by mouth nightly for 90 days.  Intended supply: 90 days 180 capsule 0    spironolactone (ALDACTONE) 25 MG tablet TAKE 1 TABLET BY MOUTH EVERY DAY 90 tablet 3    metFORMIN (GLUCOPHAGE-XR) 500 MG extended release tablet TAKE 2 TABLETS BY MOUTH TWICE A  tablet 3    gemfibrozil (LOPID) 600 MG tablet TAKE 1 TABLET BY MOUTH TWICE A  tablet 1    cloNIDine (CATAPRES) 0.1 MG tablet TAKE 1 TABLET BY MOUTH TWICE A  tablet 2    simvastatin (ZOCOR) 20 MG tablet TAKE 1 TABLET BY MOUTH EVERY DAY 90 tablet 3    DULoxetine (CYMBALTA) 20 MG extended release capsule Take 1 capsule by mouth daily 90 capsule 3    Insulin Pen Needle (BD PEN NEEDLE ELIZABETH U/F) 32G X 4 MM MISC Use 4 timesdaily 400 each 4    blood glucose test strips (ONETOUCH VERIO) strip USE AS DIRECTED TWICE DAILY AS NEEDED. DIAGNOSIS CODE E 11.40 200 strip 5    ONE TOUCH ULTRASOFT LANCETS MISC 1 each by Does not apply route 2 times daily 200 each 3    Multiple Vitamins-Minerals (CENTRUM SILVER) TABS Take  by mouth daily.  aspirin 81 MG chewable tablet Take 81 mg by mouth daily. No current facility-administered medications for this visit. Review of Systems  Constitutional: Negative for weight loss and malaise/fatigue. Negative for fever and chills. HENT: Negative for hearing loss, ear pain, nosebleeds, neck pain and tinnitus. Eyes: Negative for blurred vision. Negative for double vision, photophobia and pain. Respiratory: Negative for cough and sputum production. +SOB  Cardiovascular: Negative for chest pain, palpitations and + leg swelling. Gastrointestinal: Negative for nausea, vomiting and abdominal pain. Genitourinary: Negative for dysuria, urgency and frequency. Musculoskeletal: Negative for back pain. No joint pain  Skin: Negative for itching and rash. Neurological: Negative for dizziness. Negative for tingling, tremors, focal weakness and headaches. Endo/Heme/Allergies: see HPI  Psychiatric/Behavioral: Negative for depression and substance abuse. PHYSICAL EXAMINATION:  [ INSTRUCTIONS:  \"[x]\" Indicates a positive item  \"[]\" Indicates a negative item  -- DELETE ALL ITEMS NOT EXAMINED]  Vital Signs: (As obtained by patient/caregiver or practitioner observation)    Blood pressure-  Heart rate-    Respiratory rate-    Temperature-  Pulse oximetry-     Constitutional Appears well-developed and well-nourished No apparent distress        Mental status  Alert and awake  Oriented to person/place/time  Able to follow commands      Eyes:  EOM    [x]  Normal    Sclera  [x]  Normal           Discharge [x]  None visible      HENT:   [x] Normocephalic, atraumatic.     [x] Mouth/Throat: Mucous membranes are moist.     External Ears [x] Normal  no discharge    Neck: [x] No visualized mass  no swelling    Pulmonary/Chest: [x] Respiratory effort normal.  [x] No visualized signs of difficulty breathing or respiratory distress             Musculoskeletal:   [x] Normal gait with no signs of ataxia         [x] Normal range of motion of neck          Head and neck stable, appears normal ROM, strength good    Neurological:        [x] No Facial Asymmetry (Cranial nerve 7 motor function) (limited exam to video visit)          [x] No gaze palsy                Skin:        [x] No significant exanthematous lesions or discoloration noted on facial skin                 Psychiatric:       [x] Normal Affect [x] No Hallucinations            Other pertinent observable physical exam findings-     Due to this being a TeleHealth encounter, evaluation of the following organ systems is limited: Vitals/Constitutional/EENT/Resp/CV/GI//MS/Neuro/Skin/Heme-Lymph-Imm. Services were provided through a video synchronous discussion virtually to substitute for in-person clinic visit. 8/19  Skeletal foot exam is normal, no skin lesions, toenails are normal,10 g monofilament is 5/10 on the right and 3/10 on the left    Lab Reviewed   No components found for: CHLPL  Lab Results   Component Value Date    TRIG 147 01/09/2020    TRIG 130 10/02/2013    TRIG 156 (H) 05/01/2012     Lab Results   Component Value Date    HDL 45 01/09/2020    HDL 41 10/02/2013    HDL 47 05/01/2012     Lab Results   Component Value Date    LDLCALC 87 01/09/2020    LDLCALC 75 10/02/2013    LDLCALC 76 05/01/2012     Lab Results   Component Value Date    LABVLDL 29 01/09/2020    LABVLDL 26 10/02/2013    LABVLDL 31 05/01/2012     Lab Results   Component Value Date    LABA1C 6.5 12/05/2019       Assessment:     Steve Zhu is a 77 y.o. female with :    1.T2DM:Longstanding, well controlled, insulin resistant. She was eating more CHO,  advised low CHo diet, continue levemir,may add GLP-1 agonist , restrict CHO. A1c at goal  2. HTN: BP at goal per patient  3. HLD:last LDL at goal  4. Obesity  5. Microalbuminuria: recheck elevated, on lisinopril  6. Neuropathy: states has pain at night,  started low dose neurontin but had side effect, discussed lyrica/amitryptiline, would like to wait  She was started back on it by PCP, working now       Plan:      levemir 50 unit BID    switch to NPH if levemir too costly for patient    metformin ER 1000mg BID   Advise to check blood sugar 2-3 times a day   Patient to send blood sugar log for titration. Advise to exercise regularly but can not due to hip pain,Advise to low simple carbohydrate and protein with each  meal diet. 30gm with meal   Diabetes Care: recommend yearly eye exam, foot exam and urine microalbumin to   creatinine ratio.  Patient is up-to-date.       -Hyperlipidemia, LDL goal is <100 mg/dl   -Hypertension:Continue same  -Daily ASA:Takes  -Smoking status:Non smoker    4 months

## 2020-07-30 RX ORDER — INSULIN DETEMIR 100 [IU]/ML
50 INJECTION, SOLUTION SUBCUTANEOUS 2 TIMES DAILY
Qty: 5 PEN | Refills: 0 | Status: SHIPPED | OUTPATIENT
Start: 2020-07-30 | End: 2020-08-03

## 2020-08-03 RX ORDER — INSULIN DETEMIR 100 [IU]/ML
50 INJECTION, SOLUTION SUBCUTANEOUS 2 TIMES DAILY
Qty: 5 PEN | Refills: 0 | Status: SHIPPED | OUTPATIENT
Start: 2020-08-03 | End: 2020-08-26

## 2020-08-26 RX ORDER — INSULIN DETEMIR 100 [IU]/ML
50 INJECTION, SOLUTION SUBCUTANEOUS 2 TIMES DAILY
Qty: 5 PEN | Refills: 0 | Status: SHIPPED | OUTPATIENT
Start: 2020-08-26 | End: 2020-08-27 | Stop reason: SDUPTHER

## 2020-08-27 RX ORDER — INSULIN DETEMIR 100 [IU]/ML
INJECTION, SOLUTION SUBCUTANEOUS
Qty: 15 PEN | Refills: 2 | Status: SHIPPED | OUTPATIENT
Start: 2020-08-27 | End: 2020-10-23 | Stop reason: SDUPTHER

## 2020-10-23 RX ORDER — INSULIN DETEMIR 100 [IU]/ML
INJECTION, SOLUTION SUBCUTANEOUS
Qty: 15 PEN | Refills: 2 | Status: SHIPPED | OUTPATIENT
Start: 2020-10-23 | End: 2020-11-17

## 2020-10-23 NOTE — TELEPHONE ENCOUNTER
Medication:   Requested Prescriptions     Pending Prescriptions Disp Refills    insulin detemir (LEVEMIR FLEXTOUCH) 100 UNIT/ML injection pen 15 pen 2     Si units BID       Last Filled:      Patient Phone Number: [phone] (home)     Last appt: 2020   Next appt: Visit date not found    Last Labs DM:   Lab Results   Component Value Date    LABA1C 7.4 2020

## 2020-11-17 RX ORDER — INSULIN DETEMIR 100 [IU]/ML
INJECTION, SOLUTION SUBCUTANEOUS
Qty: 5 PEN | Refills: 2 | Status: SHIPPED | OUTPATIENT
Start: 2020-11-17 | End: 2020-11-24 | Stop reason: SDUPTHER

## 2020-11-17 NOTE — TELEPHONE ENCOUNTER
Medication:   Requested Prescriptions     Pending Prescriptions Disp Refills    insulin detemir (LEVEMIR FLEXTOUCH) 100 UNIT/ML injection pen [Pharmacy Med Name: Sophy Hugepj 100 UNIT/ML] 5 pen 2     Sig: INJECT 50 UNITS TWICE A DAY       Last Filled:      Patient Phone Number: [phone] (home)     Last appt: 7/28/2020   Next appt: 11/24/2020    Last Labs DM:   Lab Results   Component Value Date    LABA1C 7.4 08/05/2020

## 2020-11-24 ENCOUNTER — VIRTUAL VISIT (OUTPATIENT)
Dept: ENDOCRINOLOGY | Age: 70
End: 2020-11-24
Payer: MEDICARE

## 2020-11-24 PROCEDURE — G8427 DOCREV CUR MEDS BY ELIG CLIN: HCPCS | Performed by: INTERNAL MEDICINE

## 2020-11-24 PROCEDURE — 1123F ACP DISCUSS/DSCN MKR DOCD: CPT | Performed by: INTERNAL MEDICINE

## 2020-11-24 PROCEDURE — 99213 OFFICE O/P EST LOW 20 MIN: CPT | Performed by: INTERNAL MEDICINE

## 2020-11-24 PROCEDURE — G8399 PT W/DXA RESULTS DOCUMENT: HCPCS | Performed by: INTERNAL MEDICINE

## 2020-11-24 PROCEDURE — 1090F PRES/ABSN URINE INCON ASSESS: CPT | Performed by: INTERNAL MEDICINE

## 2020-11-24 PROCEDURE — 3017F COLORECTAL CA SCREEN DOC REV: CPT | Performed by: INTERNAL MEDICINE

## 2020-11-24 PROCEDURE — 4040F PNEUMOC VAC/ADMIN/RCVD: CPT | Performed by: INTERNAL MEDICINE

## 2020-11-24 PROCEDURE — 3051F HG A1C>EQUAL 7.0%<8.0%: CPT | Performed by: INTERNAL MEDICINE

## 2020-11-24 PROCEDURE — 2022F DILAT RTA XM EVC RTNOPTHY: CPT | Performed by: INTERNAL MEDICINE

## 2020-11-24 RX ORDER — INSULIN DETEMIR 100 [IU]/ML
INJECTION, SOLUTION SUBCUTANEOUS
Qty: 10 PEN | Refills: 2 | Status: SHIPPED | OUTPATIENT
Start: 2020-11-24 | End: 2020-12-23

## 2020-11-24 NOTE — PROGRESS NOTES
Seen as f/u patient for diabetes         Pursuant to the emergency declaration under the [facility] waiver authority and the [employer] Act, this Virtual  Visit was conducted, with patient's consent, to reduce the patient's risk of exposure to COVID-19 and provide continuity of care for an established patient. Patient was at home. Provider was at home. No one else was involved  Services were provided through a video synchronous discussion virtually to substitute for in-person clinic visit.     Interim;   Glucose stable    Diagnosed with Type 2 diabetes mellitus > 5   years  Known diabetic complications: none     Current diabetic medications   Levemir 50 units BID  Metformin ER 1gm BID    Moderate, controlled, no worsening factors    Intolerance to diabetes medications: yes - Metformin -bad taste in mouth    Last A1c 6.5%<-----6.7%<------7%<-----7.2%<------- 6.3%<------5.5%<------- 8.1 on 4/17<------6.9 on 7/16<------- 6.9 on 4/16<-----5.9 on 1/16<----- 6.4 on 10/15<------ 6.9 on 6/15<-----5.9 on 2/15<----7.4 on 10/13<---- 8.8 on 5/13<---9.2<---9.6    Prior visit with dietician: No  Current diet: on average, 3 meals per day trying to watch  Current exercise: walking   Current monitoring regimen: home blood tests - 1-2 times daily     Has brought blood glucose log/meter:yes  Home blood sugar records:100s  Any episodes of hypoglycemia no    No Hx of CAD , PVD, CVA    Hyperlipidemia:for years, controlled  simvastatin 20mg  LDL 74 on 3/14  Gemfibrozil 600 BID  LDL 87 on 1/20  Last eye exam: 12/19  Last foot exam:8/19    She had some microalbuminuria  Last microalbumin to creatinine ratio: 53.8 on 6/14---> 9/14 nl, 2/16 nl---> 34.9 on 9/17---> 139 on 12/18---> 139 on 12/19    HTN: For years, taking medication, stable  norvasc 5 BID  lisinopril 40mg aldactone 25 clonidine 0.1 BID     Past Medical History:   Diagnosis Date    Arthritis     Chronic kidney disease (CKD) stage G3a/A3, moderately decreased glomerular filtration rate (GFR) between 45-59 mL/min/1.73 square meter and albuminuria creatinine ratio greater than 300 mg/g (HCC)     Diverticulitis     Essential hypertension 7/1/2011    Hyperlipidemia     Neuropathy     PONV (postoperative nausea and vomiting)     only hysterectomy    Type 2 diabetes mellitus without complication ([facility] [address] 2/1/0580     Past Surgical History:   Procedure Laterality Date    APPENDECTOMY  11/12/2017    [facility]    CHOLECYSTECTOMY      COLONOSCOPY      x`s 2-3    COLONOSCOPY N/A 2/12/2020    Dr. Joe Mcgrath, 6 polyps removed, repeat in 2023    HYSTERECTOMY, TOTAL ABDOMINAL       Current Outpatient Medications   Medication Sig Dispense Refill    insulin detemir (LEVEMIR FLEXTOUCH) 100 UNIT/ML injection pen INJECT 50 UNITS TWICE A DAY 5 pen 2    gemfibrozil (LOPID) 600 MG tablet TAKE 1 TABLET BY MOUTH TWICE A  tablet 1    allopurinol (ZYLOPRIM) 100 MG tablet Take 1 tablet by mouth daily 90 tablet 1    amLODIPine (NORVASC) 5 MG tablet TAKE 1 TABLET BY MOUTH TWICE A  tablet 3    furosemide (LASIX) 20 MG tablet TAKE 1 TABLET BY MOUTH EVERY DAY 90 tablet 1    allopurinol (ZYLOPRIM) 300 MG tablet Take 1 tablet by mouth daily 90 tablet 1    lisinopril (PRINIVIL;ZESTRIL) 40 MG tablet Take 1 tablet by mouth daily 30 tablet 5    spironolactone (ALDACTONE) 25 MG tablet TAKE 1 TABLET BY MOUTH EVERY DAY 90 tablet 3    metFORMIN (GLUCOPHAGE-XR) 500 MG extended release tablet TAKE 2 TABLETS BY MOUTH TWICE A  tablet 3    cloNIDine (CATAPRES) 0.1 MG tablet TAKE 1 TABLET BY MOUTH TWICE A  tablet 2    simvastatin (ZOCOR) 20 MG tablet TAKE 1 TABLET BY MOUTH EVERY DAY 90 tablet 3    DULoxetine (CYMBALTA) 20 MG extended release capsule Take 1 capsule by mouth daily 90 capsule 3    Insulin Pen Needle (BD PEN NEEDLE ELIZABETH U/F) 32G X 4 MM MISC Use 4 timesdaily 400 each 4    blood glucose Normal           Discharge [x]  None visible      HENT:   [x] Normocephalic, atraumatic. [x] Mouth/Throat: Mucous membranes are moist.     External Ears [x] Normal  no discharge    Neck: [x] No visualized mass  no swelling    Pulmonary/Chest: [x] Respiratory effort normal.  [x] No visualized signs of difficulty breathing or respiratory distress             Musculoskeletal:   [x] Normal gait with no signs of ataxia         [x] Normal range of motion of neck          Head and neck stable, appears normal ROM, strength good    Neurological:        [x] No Facial Asymmetry (Cranial nerve 7 motor function) (limited exam to video visit)          [x] No gaze palsy                Skin:        [x] No significant exanthematous lesions or discoloration noted on facial skin                 Psychiatric:       [x] Normal Affect [x] No Hallucinations            Other pertinent observable physical exam findings-     Due to this being a TeleHealth encounter, evaluation of the following organ systems is limited: Vitals/Constitutional/EENT/Resp/CV/GI//MS/Neuro/Skin/Heme-Lymph-Imm. Services were provided through a video synchronous discussion virtually to substitute for in-person clinic visit. 8/19  Skeletal foot exam is normal, no skin lesions, toenails are normal,10 g monofilament is 5/10 on the right and 3/10 on the left    Lab Reviewed   No components found for: CHLPL  Lab Results   Component Value Date    TRIG 147 01/09/2020    TRIG 130 10/02/2013    TRIG 156 (H) 05/01/2012     Lab Results   Component Value Date    HDL 45 01/09/2020    HDL 41 10/02/2013    HDL 47 05/01/2012     Lab Results   Component Value Date    LDLCALC 87 01/09/2020    LDLCALC 75 10/02/2013    LDLCALC 76 05/01/2012     Lab Results   Component Value Date    LABVLDL 29 01/09/2020    LABVLDL 26 10/02/2013    LABVLDL 31 05/01/2012     Lab Results   Component Value Date    LABA1C 7.4 08/05/2020       Assessment:     Theresa Temple is a 77 y.o.

## 2020-12-23 RX ORDER — INSULIN DETEMIR 100 [IU]/ML
INJECTION, SOLUTION SUBCUTANEOUS
Qty: 5 PEN | Refills: 3 | Status: SHIPPED | OUTPATIENT
Start: 2020-12-23 | End: 2021-06-17

## 2020-12-23 NOTE — TELEPHONE ENCOUNTER
Medication:   Requested Prescriptions     Pending Prescriptions Disp Refills    LEVEMIR FLEXTOUCH 100 UNIT/ML injection pen [Pharmacy Med Name: Jerardo Gomez 100 UNIT/ML] 5 pen 1     Sig: INJECT 50 UNITS TWICE A DAY         Last appt: 11/24/2020   Next appt: Visit date not found    Last Labs DM:   Lab Results   Component Value Date    LABA1C 7.4 08/05/2020

## 2021-01-25 RX ORDER — BLOOD SUGAR DIAGNOSTIC
STRIP MISCELLANEOUS
Qty: 200 STRIP | Refills: 5 | Status: SHIPPED | OUTPATIENT
Start: 2021-01-25 | End: 2022-07-05

## 2021-01-25 NOTE — TELEPHONE ENCOUNTER
Medication:   Requested Prescriptions     Pending Prescriptions Disp Refills    blood glucose test strips (ONETOUCH VERIO) strip [Pharmacy Med Name: ONE TOUCH VERIO TEST STRIP] 200 strip 5     Sig: USE AS DIRECTED TWICE DAILY AS NEEDED.  DIAGNOSIS CODE E 11.40 *NEED PART B*       Last Filled:      Patient Phone Number: [phone] (home)     Last appt: 11/24/2020   Next appt: Visit date not found    Last Labs DM:   Lab Results   Component Value Date    LABA1C 7.4 08/05/2020     Last Lipid:   Lab Results   Component Value Date    CHOL 161 01/09/2020    TRIG 147 01/09/2020    HDL 45 01/09/2020    HDL 47 05/01/2012    LDLCALC 87 01/09/2020     Last PSA: No results found for: PSA  Last Thyroid: No results found for: TSH, FT3, D5EJPDK, T4FREE, L6TYSGO

## 2021-02-04 ENCOUNTER — HOSPITAL ENCOUNTER (OUTPATIENT)
Age: 71
Discharge: HOME | End: 2021-02-04
Payer: MEDICARE

## 2021-02-04 ENCOUNTER — HOSPITAL ENCOUNTER (OUTPATIENT)
Dept: CT IMAGING | Age: 71
Discharge: HOME | End: 2021-02-04
Payer: MEDICARE

## 2021-02-04 DIAGNOSIS — R10.32 LLQ PAIN: ICD-10-CM

## 2021-02-04 LAB
A/G RATIO: 0.9 (ref 1.1–2.2)
ALBUMIN SERPL-MCNC: 3.7 G/DL (ref 3.4–5)
ALP BLD-CCNC: 88 U/L (ref 40–129)
ALT SERPL-CCNC: 16 U/L (ref 10–40)
ANION GAP SERPL CALCULATED.3IONS-SCNC: 15 MMOL/L (ref 3–16)
AST SERPL-CCNC: 65 U/L (ref 15–37)
BASOPHILS ABSOLUTE: 0.1 K/UL (ref 0–0.2)
BASOPHILS RELATIVE PERCENT: 1.1 %
BILIRUB SERPL-MCNC: 0.3 MG/DL (ref 0–1)
BUN BLDV-MCNC: 40 MG/DL (ref 7–20)
CALCIUM SERPL-MCNC: 9.1 MG/DL (ref 8.3–10.6)
CHLORIDE BLD-SCNC: 99 MMOL/L (ref 99–110)
CO2: 22 MMOL/L (ref 21–32)
CREAT SERPL-MCNC: 1.6 MG/DL (ref 0.6–1.2)
EOSINOPHILS ABSOLUTE: 0.3 K/UL (ref 0–0.6)
EOSINOPHILS RELATIVE PERCENT: 2.9 %
GFR AFRICAN AMERICAN: 39
GFR NON-AFRICAN AMERICAN: 32
GLOBULIN: 3.9 G/DL
GLUCOSE BLD-MCNC: 242 MG/DL (ref 70–99)
HCT VFR BLD CALC: 39.2 % (ref 36–48)
HEMOGLOBIN: 12.6 G/DL (ref 12–16)
LYMPHOCYTES ABSOLUTE: 1 K/UL (ref 1–5.1)
LYMPHOCYTES RELATIVE PERCENT: 11.3 %
MCH RBC QN AUTO: 30.8 PG (ref 26–34)
MCHC RBC AUTO-ENTMCNC: 32.2 G/DL (ref 31–36)
MCV RBC AUTO: 95.7 FL (ref 80–100)
MONOCYTES ABSOLUTE: 0.6 K/UL (ref 0–1.3)
MONOCYTES RELATIVE PERCENT: 7.1 %
NEUTROPHILS ABSOLUTE: 7.1 K/UL (ref 1.7–7.7)
NEUTROPHILS RELATIVE PERCENT: 77.6 %
PDW BLD-RTO: 17.9 % (ref 12.4–15.4)
PLATELET # BLD: 199 K/UL (ref 135–450)
PMV BLD AUTO: 10.6 FL (ref 5–10.5)
RBC # BLD: 4.1 M/UL (ref 4–5.2)
SODIUM BLD-SCNC: 136 MMOL/L (ref 136–145)
TOTAL PROTEIN: 7.6 G/DL (ref 6.4–8.2)
WBC # BLD: 9.1 K/UL (ref 4–11)

## 2021-02-04 PROCEDURE — 36415 COLL VENOUS BLD VENIPUNCTURE: CPT

## 2021-02-04 PROCEDURE — 85025 COMPLETE CBC W/AUTO DIFF WBC: CPT

## 2021-02-04 PROCEDURE — 80053 COMPREHEN METABOLIC PANEL: CPT

## 2021-02-04 PROCEDURE — 6360000004 HC RX CONTRAST MEDICATION: Performed by: INTERNAL MEDICINE

## 2021-02-04 PROCEDURE — 74177 CT ABD & PELVIS W/CONTRAST: CPT

## 2021-02-04 RX ADMIN — IOPAMIDOL 75 ML: 755 INJECTION, SOLUTION INTRAVENOUS at 14:45

## 2021-02-18 DIAGNOSIS — M10.9 GOUT, UNSPECIFIED CAUSE, UNSPECIFIED CHRONICITY, UNSPECIFIED SITE: ICD-10-CM

## 2021-02-18 DIAGNOSIS — N17.9 ACUTE KIDNEY INJURY (HCC): ICD-10-CM

## 2021-02-18 DIAGNOSIS — E11.8 CONTROLLED TYPE 2 DIABETES MELLITUS WITH COMPLICATION, WITH LONG-TERM CURRENT USE OF INSULIN (HCC): ICD-10-CM

## 2021-02-18 DIAGNOSIS — I10 ESSENTIAL HYPERTENSION: ICD-10-CM

## 2021-02-18 DIAGNOSIS — Z79.4 CONTROLLED TYPE 2 DIABETES MELLITUS WITH COMPLICATION, WITH LONG-TERM CURRENT USE OF INSULIN (HCC): ICD-10-CM

## 2021-02-18 LAB
A/G RATIO: 1.2 (ref 1.1–2.2)
ALBUMIN SERPL-MCNC: 4.3 G/DL (ref 3.4–5)
ALP BLD-CCNC: 93 U/L (ref 40–129)
ALT SERPL-CCNC: 11 U/L (ref 10–40)
ANION GAP SERPL CALCULATED.3IONS-SCNC: 14 MMOL/L (ref 3–16)
AST SERPL-CCNC: 16 U/L (ref 15–37)
BILIRUB SERPL-MCNC: 0.4 MG/DL (ref 0–1)
BUN BLDV-MCNC: 23 MG/DL (ref 7–20)
CALCIUM SERPL-MCNC: 10.7 MG/DL (ref 8.3–10.6)
CHLORIDE BLD-SCNC: 105 MMOL/L (ref 99–110)
CO2: 24 MMOL/L (ref 21–32)
CREAT SERPL-MCNC: 1 MG/DL (ref 0.6–1.2)
GFR AFRICAN AMERICAN: >60
GFR NON-AFRICAN AMERICAN: 55
GLOBULIN: 3.6 G/DL
GLUCOSE BLD-MCNC: 129 MG/DL (ref 70–99)
POTASSIUM SERPL-SCNC: 5.5 MMOL/L (ref 3.5–5.1)
SODIUM BLD-SCNC: 143 MMOL/L (ref 136–145)
TOTAL PROTEIN: 7.9 G/DL (ref 6.4–8.2)
URIC ACID, SERUM: 5.4 MG/DL (ref 2.6–6)

## 2021-02-19 DIAGNOSIS — I10 ESSENTIAL HYPERTENSION: ICD-10-CM

## 2021-02-19 DIAGNOSIS — E11.8 CONTROLLED TYPE 2 DIABETES MELLITUS WITH COMPLICATION, WITH LONG-TERM CURRENT USE OF INSULIN (HCC): ICD-10-CM

## 2021-02-19 DIAGNOSIS — Z79.4 CONTROLLED TYPE 2 DIABETES MELLITUS WITH COMPLICATION, WITH LONG-TERM CURRENT USE OF INSULIN (HCC): ICD-10-CM

## 2021-02-19 LAB
CREATININE URINE: 57.7 MG/DL (ref 28–259)
ESTIMATED AVERAGE GLUCOSE: 151.3 MG/DL
HBA1C MFR BLD: 6.9 %
MICROALBUMIN UR-MCNC: 8.9 MG/DL
MICROALBUMIN/CREAT UR-RTO: 154.2 MG/G (ref 0–30)

## 2021-02-22 ENCOUNTER — HOSPITAL ENCOUNTER (OUTPATIENT)
Dept: MRI IMAGING | Age: 71
Discharge: HOME | End: 2021-02-22
Payer: MEDICARE

## 2021-02-22 DIAGNOSIS — N28.9 RENAL LESION: ICD-10-CM

## 2021-02-22 PROCEDURE — 6360000004 HC RX CONTRAST MEDICATION: Performed by: INTERNAL MEDICINE

## 2021-02-22 PROCEDURE — A9577 INJ MULTIHANCE: HCPCS | Performed by: INTERNAL MEDICINE

## 2021-02-22 PROCEDURE — 74183 MRI ABD W/O CNTR FLWD CNTR: CPT

## 2021-02-22 RX ADMIN — GADOBENATE DIMEGLUMINE 20 ML: 529 INJECTION, SOLUTION INTRAVENOUS at 11:19

## 2021-03-08 RX ORDER — PEN NEEDLE, DIABETIC 32GX 5/32"
NEEDLE, DISPOSABLE MISCELLANEOUS
Qty: 100 EACH | Refills: 4 | Status: SHIPPED | OUTPATIENT
Start: 2021-03-08 | End: 2021-11-18

## 2021-04-09 ENCOUNTER — VIRTUAL VISIT (OUTPATIENT)
Dept: ENDOCRINOLOGY | Age: 71
End: 2021-04-09
Payer: MEDICARE

## 2021-04-09 DIAGNOSIS — Z79.4 CONTROLLED TYPE 2 DIABETES MELLITUS WITH COMPLICATION, WITH LONG-TERM CURRENT USE OF INSULIN (HCC): Primary | ICD-10-CM

## 2021-04-09 DIAGNOSIS — R80.9 MICROALBUMINURIA: ICD-10-CM

## 2021-04-09 DIAGNOSIS — E11.8 CONTROLLED TYPE 2 DIABETES MELLITUS WITH COMPLICATION, WITH LONG-TERM CURRENT USE OF INSULIN (HCC): Primary | ICD-10-CM

## 2021-04-09 DIAGNOSIS — E83.52 HYPERCALCEMIA: ICD-10-CM

## 2021-04-09 PROCEDURE — 2022F DILAT RTA XM EVC RTNOPTHY: CPT | Performed by: INTERNAL MEDICINE

## 2021-04-09 PROCEDURE — 99214 OFFICE O/P EST MOD 30 MIN: CPT | Performed by: INTERNAL MEDICINE

## 2021-04-09 PROCEDURE — 4040F PNEUMOC VAC/ADMIN/RCVD: CPT | Performed by: INTERNAL MEDICINE

## 2021-04-09 PROCEDURE — G8399 PT W/DXA RESULTS DOCUMENT: HCPCS | Performed by: INTERNAL MEDICINE

## 2021-04-09 PROCEDURE — 3017F COLORECTAL CA SCREEN DOC REV: CPT | Performed by: INTERNAL MEDICINE

## 2021-04-09 PROCEDURE — 3044F HG A1C LEVEL LT 7.0%: CPT | Performed by: INTERNAL MEDICINE

## 2021-04-09 PROCEDURE — G8427 DOCREV CUR MEDS BY ELIG CLIN: HCPCS | Performed by: INTERNAL MEDICINE

## 2021-04-09 PROCEDURE — 1090F PRES/ABSN URINE INCON ASSESS: CPT | Performed by: INTERNAL MEDICINE

## 2021-04-09 PROCEDURE — 1123F ACP DISCUSS/DSCN MKR DOCD: CPT | Performed by: INTERNAL MEDICINE

## 2021-04-09 NOTE — PROGRESS NOTES
Seen as f/u patient for diabetes         Pursuant to the emergency declaration under the [address], [facility] waiver authority and the [employer] Act, this Virtual  Visit was conducted, with patient's consent, to reduce the patient's risk of exposure to COVID-19 and provide continuity of care for an established patient. Patient was at home. Provider was at home. No one else was involved  Services were provided through a video synchronous discussion virtually to substitute for in-person clinic visit.     Interim;   Glucose stable    She had hypercalcemia noted on labs  10/20 Ca 10.0 PTH 56  2/21 Ca 10.7     She is off MVT now    Diagnosed with Type 2 diabetes mellitus > 5   years  Known diabetic complications: none     Current diabetic medications   Levemir 50 units BID  Metformin ER 1gm BID    Moderate, controlled, no worsening factors    Intolerance to diabetes medications: yes - Metformin -bad taste in mouth    Last A1c 6.9%<---- 6.5%<-----6.7%<------7%<-----7.2%<------- 6.3%<------5.5%<------- 8.1 on 4/17<------6.9 on 7/16<------- 6.9 on 4/16<-----5.9 on 1/16<----- 6.4 on 10/15<------ 6.9 on 6/15<-----5.9 on 2/15<----7.4 on 10/13<---- 8.8 on 5/13<---9.2<---9.6    Prior visit with dietician: No  Current diet: on average, 3 meals per day trying to watch  Current exercise: walking   Current monitoring regimen: home blood tests - 1-2 times daily     Has brought blood glucose log/meter:yes  Home blood sugar records:   Any episodes of hypoglycemia no    No Hx of CAD , PVD, CVA    Hyperlipidemia:for years, controlled  simvastatin 20mg  LDL 74 on 3/14  Gemfibrozil 600 BID  LDL 87 on 1/20  Last eye exam: 12/19  Last foot exam:8/19    She had some microalbuminuria  Last microalbumin to creatinine ratio: 53.8 on 6/14---> 9/14 nl, 2/16 nl---> 34.9 on 9/17---> 139 on 12/18---> 139 on 12/19---> 154.2 on 2/21    HTN: For years, taking medication, stable  norvasc 5 BID  lisinopril 40mg aldactone 25 clonidine 0.1 BID     Past Medical History:   Diagnosis Date    Arthritis     Chronic kidney disease (CKD) stage G3a/A3, moderately decreased glomerular filtration rate (GFR) between 45-59 mL/min/1.73 square meter and albuminuria creatinine ratio greater than 300 mg/g     Diverticulitis     Essential hypertension 07/01/2011    Hyperlipidemia     Neuropathy     PONV (postoperative nausea and vomiting)     only hysterectomy    Renal cyst     Type 2 [facility] ([facility] 13/33/9234     Past Surgical History:   Procedure Laterality Date    [facility]ENDECTOMY  11/12/2017    [facility]    CHOLECYSTECTOMY      COLONOSCOPY      x`s 2-3    COLONOSCOPY N/A 2/12/2020    Dr. Christiana Mtz, 6 polyps removed, repeat in 2023    HYSTERECTOMY, TOTAL ABDOMINAL       Current Outpatient Medications   Medication Sig Dispense Refill    allopurinol (ZYLOPRIM) 100 MG tablet TAKE 1 TABLET BY MOUTH EVERY DAY 90 tablet 1    gabapentin (NEURONTIN) 100 MG capsule TAKE 2 CAPSULES BY MOUTH NIGHTLY FOR 90 DAYS. 180 capsule 0    Insulin Pen Needle (BD PEN NEEDLE ELIZABETH U/F) 32G X 4 MM MISC USE 4 TIMESDAILY 100 each 4    simvastatin (ZOCOR) 20 MG tablet TAKE 1 TABLET BY MOUTH EVERY DAY 90 tablet 3    blood glucose test strips (ONETOUCH VERIO) strip USE AS DIRECTED TWICE DAILY AS NEEDED.  DIAGNOSIS CODE E 11.40 *NEED PART B* 200 strip 5    LEVEMIR FLEXTOUCH 100 UNIT/ML injection pen INJECT 50 UNITS TWICE A DAY 5 pen 3    furosemide (LASIX) 20 MG tablet TAKE 1 TABLET BY MOUTH EVERY DAY 90 tablet 1    lisinopril (PRINIVIL;ZESTRIL) 40 MG tablet Take 1 tablet by mouth daily 30 tablet 5    allopurinol (ZYLOPRIM) 300 MG tablet Take 1 tablet by mouth daily 90 tablet 1    cloNIDine (CATAPRES) 0.1 MG tablet TAKE 1 TABLET BY MOUTH TWICE A  tablet 2    gemfibrozil (LOPID) 600 MG tablet TAKE 1 TABLET BY MOUTH TWICE A  tablet 1    amLODIPine (NORVASC) 5 MG tablet TAKE 1 TABLET BY MOUTH TWICE A  tablet 3    spironolactone (ALDACTONE) 25 MG tablet TAKE 1 TABLET BY MOUTH EVERY DAY 90 tablet 3    metFORMIN (GLUCOPHAGE-XR) 500 MG extended release tablet TAKE 2 TABLETS BY MOUTH TWICE A  tablet 3    ONE TOUCH ULTRASOFT LANCETS MISC 1 each by Does not apply route 2 times daily 200 each 3    Multiple Vitamins-Minerals (CENTRUM SILVER) TABS Take  by mouth daily.  aspirin 81 MG chewable tablet Take 81 mg by mouth daily.  DULoxetine (CYMBALTA) 20 MG extended release capsule Take 1 capsule by mouth daily 90 capsule 3     No current facility-administered medications for this visit. Review of Systems  Constitutional: Negative for weight loss and malaise/fatigue. Negative for fever and chills. HENT: Negative for hearing loss, ear pain, nosebleeds, neck pain and tinnitus. Eyes: Negative for blurred vision. Negative for double vision, photophobia and pain. Respiratory: Negative for cough and sputum production. +SOB  Cardiovascular: Negative for chest pain, palpitations and + leg swelling. Gastrointestinal: Negative for nausea, vomiting and abdominal pain. Genitourinary: Negative for dysuria, urgency and frequency. Musculoskeletal: Negative for back pain. No joint pain  Skin: Negative for itching and rash. Neurological: Negative for dizziness. Negative for tingling, tremors, focal weakness and headaches. Endo/Heme/Allergies: see HPI  Psychiatric/Behavioral: Negative for depression and substance abuse.            PHYSICAL EXAMINATION:  [ INSTRUCTIONS:  \"[x]\" Indicates a positive item  \"[]\" Indicates a negative item  -- DELETE ALL ITEMS NOT EXAMINED]  Vital Signs: (As obtained by patient/caregiver or practitioner observation)    Blood pressure-  Heart rate-    Respiratory rate-    Temperature-  Pulse oximetry-     Constitutional Appears well-developed and well-nourished No apparent distress        Mental status  Alert and awake Oriented to person/place/time  Able to follow commands      Eyes:  EOM    [x]  Normal    Sclera  [x]  Normal           Discharge [x]  None visible      HENT:   [x] Normocephalic, atraumatic. [x] Mouth/Throat: Mucous membranes are moist.     External Ears [x] Normal  no discharge    Neck: [x] No visualized mass  no swelling    Pulmonary/Chest: [x] Respiratory effort normal.  [x] No visualized signs of difficulty breathing or respiratory distress             Musculoskeletal:   [x] Normal gait with no signs of ataxia         [x] Normal range of motion of neck          Head and neck stable, appears normal ROM, strength good    Neurological:        [x] No Facial Asymmetry (Cranial nerve 7 motor function) (limited exam to video visit)          [x] No gaze palsy                Skin:        [x] No significant exanthematous lesions or discoloration noted on facial skin                 Psychiatric:       [x] Normal Affect [x] No Hallucinations            Other pertinent observable physical exam findings-     Due to this being a TeleHealth encounter, evaluation of the following organ systems is limited: Vitals/Constitutional/EENT/Resp/CV/GI//MS/Neuro/Skin/Heme-Lymph-Imm. Services were provided through a video synchronous discussion virtually to substitute for in-person clinic visit.          8/19  Skeletal foot exam is normal, no skin lesions, toenails are normal,10 g monofilament is 5/10 on the right and 3/10 on the left    Lab Reviewed   No components found for: CHLPL  Lab Results   Component Value Date    TRIG 147 01/09/2020    TRIG 130 10/02/2013    TRIG 156 (H) 05/01/2012     Lab Results   Component Value Date    HDL 45 01/09/2020    HDL 41 10/02/2013    HDL 47 05/01/2012     Lab Results   Component Value Date    LDLCALC 87 01/09/2020    LDLCALC 75 10/02/2013    LDLCALC 76 05/01/2012     Lab Results   Component Value Date    LABVLDL 29 01/09/2020    LABVLDL 26 10/02/2013    LABVLDL 31 05/01/2012     Lab Results Component Value Date    LABA1C 6.9 02/18/2021       Assessment:     Yaima Mota is a 72 y.o. female with :    1.T2DM:Longstanding, well controlled, insulin resistant. She was eating more CHO,  advised low CHo diet, continue levemir,may add GLP-1 agonist , restrict CHO. A1c at goal- no change in dose  2. HTN: BP at goal per patient  3. HLD:last LDL at goal  4. Obesity  5. Microalbuminuria: recheck elevated, on lisinopril  6. Neuropathy: states has pain at night,  started low dose neurontin but had side effect, discussed lyrica/amitryptiline, would like to wait  She was started back on it by PCP, working now  7. Hypercalcemia: Mild, PTH not completely suppressed, may have mild primary hyperparathyroidism. Check vitamin D. Plan:      levemir 50 unit BID    switch to NPH if levemir too costly for patient    metformin ER 1000mg BID   Advise to check blood sugar 2-3 times a day   Patient to send blood sugar log for titration. Advise to exercise regularly but can not due to hip pain,Advise to low simple carbohydrate and protein with each  meal diet. 30gm with meal   Diabetes Care: recommend yearly eye exam, foot exam and urine microalbumin to   creatinine ratio.  Patient is up-to-date.       -Hyperlipidemia, LDL goal is <100 mg/dl   -Hypertension:Continue same  -Daily ASA:Takes  -Smoking status:Non smoker    4 months

## 2021-05-24 RX ORDER — METFORMIN HYDROCHLORIDE 500 MG/1
TABLET, EXTENDED RELEASE ORAL
Qty: 360 TABLET | Refills: 3 | Status: SHIPPED | OUTPATIENT
Start: 2021-05-24 | End: 2022-05-24

## 2021-05-24 NOTE — TELEPHONE ENCOUNTER
Medication:   Requested Prescriptions     Pending Prescriptions Disp Refills    metFORMIN (GLUCOPHAGE-XR) 500 MG extended release tablet [Pharmacy Med Name: METFORMIN HCL  MG TABLET] 360 tablet 3     Sig: TAKE 2 TABLETS BY MOUTH TWICE A DAY       Last Filled:      Patient Phone Number: [phone] (home)     Last appt: 04/09/2021  Next appt: 07/22/2021    Last Labs DM:   Lab Results   Component Value Date    LABA1C 6.9 02/18/2021

## 2021-06-17 RX ORDER — INSULIN DETEMIR 100 [IU]/ML
INJECTION, SOLUTION SUBCUTANEOUS
Qty: 5 PEN | Refills: 3 | Status: SHIPPED | OUTPATIENT
Start: 2021-06-17 | End: 2021-08-11

## 2021-06-17 NOTE — TELEPHONE ENCOUNTER
Medication:   Requested Prescriptions     Pending Prescriptions Disp Refills    insulin detemir (LEVEMIR FLEXTOUCH) 100 UNIT/ML injection pen [Pharmacy Med Name: Nora Jeb 100 UNIT/ML] 5 pen 3     Sig: INJECT 50 UNITS SUBCUTANEOUSLY TWICE A DAY       Last Filled:      Patient Phone Number: [phone] (home)     Last appt: 4/9/2021   Next appt: Visit date not found    Last Labs DM:   Lab Results   Component Value Date    LABA1C 6.9 02/18/2021

## 2021-07-22 ENCOUNTER — OFFICE VISIT (OUTPATIENT)
Dept: ENDOCRINOLOGY | Age: 71
End: 2021-07-22
Payer: MEDICARE

## 2021-07-22 VITALS
DIASTOLIC BLOOD PRESSURE: 70 MMHG | HEIGHT: 65 IN | SYSTOLIC BLOOD PRESSURE: 136 MMHG | OXYGEN SATURATION: 90 % | HEART RATE: 65 BPM | BODY MASS INDEX: 43.35 KG/M2 | WEIGHT: 260.2 LBS

## 2021-07-22 DIAGNOSIS — Z79.4 CONTROLLED TYPE 2 DIABETES MELLITUS WITH COMPLICATION, WITH LONG-TERM CURRENT USE OF INSULIN (HCC): Primary | ICD-10-CM

## 2021-07-22 DIAGNOSIS — E11.8 CONTROLLED TYPE 2 DIABETES MELLITUS WITH COMPLICATION, WITH LONG-TERM CURRENT USE OF INSULIN (HCC): Primary | ICD-10-CM

## 2021-07-22 LAB — HBA1C MFR BLD: 6.8 %

## 2021-07-22 PROCEDURE — 2022F DILAT RTA XM EVC RTNOPTHY: CPT | Performed by: INTERNAL MEDICINE

## 2021-07-22 PROCEDURE — 1090F PRES/ABSN URINE INCON ASSESS: CPT | Performed by: INTERNAL MEDICINE

## 2021-07-22 PROCEDURE — 1036F TOBACCO NON-USER: CPT | Performed by: INTERNAL MEDICINE

## 2021-07-22 PROCEDURE — G8417 CALC BMI ABV UP PARAM F/U: HCPCS | Performed by: INTERNAL MEDICINE

## 2021-07-22 PROCEDURE — G8427 DOCREV CUR MEDS BY ELIG CLIN: HCPCS | Performed by: INTERNAL MEDICINE

## 2021-07-22 PROCEDURE — 83036 HEMOGLOBIN GLYCOSYLATED A1C: CPT | Performed by: INTERNAL MEDICINE

## 2021-07-22 PROCEDURE — 1123F ACP DISCUSS/DSCN MKR DOCD: CPT | Performed by: INTERNAL MEDICINE

## 2021-07-22 PROCEDURE — 3017F COLORECTAL CA SCREEN DOC REV: CPT | Performed by: INTERNAL MEDICINE

## 2021-07-22 PROCEDURE — G8399 PT W/DXA RESULTS DOCUMENT: HCPCS | Performed by: INTERNAL MEDICINE

## 2021-07-22 PROCEDURE — 4040F PNEUMOC VAC/ADMIN/RCVD: CPT | Performed by: INTERNAL MEDICINE

## 2021-07-22 PROCEDURE — 99214 OFFICE O/P EST MOD 30 MIN: CPT | Performed by: INTERNAL MEDICINE

## 2021-07-22 PROCEDURE — 3044F HG A1C LEVEL LT 7.0%: CPT | Performed by: INTERNAL MEDICINE

## 2021-07-22 NOTE — PROGRESS NOTES
Seen as f/u patient for diabetes       Interim;   Glucose stable    She had hypercalcemia noted on labs  10/20 Ca 10.0 PTH 56  2/21 Ca 10.7     She is off MVT now    Diagnosed with Type 2 diabetes mellitus > 5   years  Known diabetic complications: none     Current diabetic medications   Levemir 50 units BID  Metformin ER 1gm BID    Moderate, controlled, no worsening factors    Intolerance to diabetes medications: yes - Metformin -bad taste in mouth    Last A1c 6.8%<-----6.9%<---- 6.5%<-----6.7%<------7%<-----7.2%<------- 6.3%<------5.5%<------- 8.1 on 4/17<------6.9 on 7/16<------- 6.9 on 4/16<-----5.9 on 1/16<----- 6.4 on 10/15<------ 6.9 on 6/15<-----5.9 on 2/15<----7.4 on 10/13<---- 8.8 on 5/13<---9.2<---9.6    Prior visit with dietician: No  Current diet: on average, 3 meals per day trying to watch  Current exercise: walking   Current monitoring regimen: home blood tests - 1-2 times daily     Has brought blood glucose log/meter:yes  Home blood sugar records:   Any episodes of hypoglycemia no    No Hx of CAD , PVD, CVA    Hyperlipidemia:for years, controlled  simvastatin 20mg  LDL 74 on 3/14  Gemfibrozil 600 BID  LDL 87 on 1/20  Last eye exam: 5/21  Last foot exam:8/19    She had some microalbuminuria  Last microalbumin to creatinine ratio: 53.8 on 6/14---> 9/14 nl, 2/16 nl---> 34.9 on 9/17---> 139 on 12/18---> 139 on 12/19---> 154.2 on 2/21    HTN: For years, taking medication, stable  norvasc 5 BID  lisinopril 40mg aldactone 25 clonidine 0.1 BID     Past Medical History:   Diagnosis Date    Arthritis     Chronic kidney disease (CKD) stage G3a/A3, moderately decreased glomerular filtration rate (GFR) between 45-59 mL/min/1.73 square meter and albuminuria creatinine ratio greater than 300 mg/g ([facility]     Diverticulitis     Essential hypertension 07/01/2011    Hyperlipidemia     Neuropathy     PONV (postoperative nausea and vomiting)     only hysterectomy    Renal cyst     [facility] 78/47/7108     Past Surgical History:   Procedure Laterality Date    [facility]EN[facility]OMY  11/12/2017    [facility]    CHOLECYSTECTOMY      COLONOSCOPY      x`s 2-3    COLONOSCOPY N/A 2/12/2020    Dr. Dagmar Davidson, 6 polyps removed, repeat in 2023    HYSTERECTOMY, TOTAL ABDOMINAL       Current Outpatient Medications   Medication Sig Dispense Refill    gabapentin (NEURONTIN) 100 MG capsule TAKE 2 CAPSULES BY MOUTH NIGHTLY FOR 90 DAYS. 180 capsule 0    lisinopril (PRINIVIL;ZESTRIL) 40 MG tablet TAKE 1 TABLET BY MOUTH EVERY DAY 90 tablet 1    allopurinol (ZYLOPRIM) 300 MG tablet TAKE 1 TABLET BY MOUTH EVERY DAY 90 tablet 1    furosemide (LASIX) 20 MG tablet TAKE 1 TABLET BY MOUTH EVERY DAY 90 tablet 1    insulin detemir (LEVEMIR FLEXTOUCH) 100 UNIT/ML injection pen INJECT 50 UNITS SUBCUTANEOUSLY TWICE A DAY 5 pen 3    allopurinol (ZYLOPRIM) 100 MG tablet TAKE 1 TABLET BY MOUTH EVERY DAY 90 tablet 1    spironolactone (ALDACTONE) 25 MG tablet TAKE 1 TABLET BY MOUTH EVERY DAY 90 tablet 3    metFORMIN (GLUCOPHAGE-XR) 500 MG extended release tablet TAKE 2 TABLETS BY MOUTH TWICE A  tablet 3    gemfibrozil (LOPID) 600 MG tablet TAKE 1 TABLET BY MOUTH TWICE A  tablet 1    Insulin Pen Needle (BD PEN NEEDLE ELIZABETH U/F) 32G X 4 MM MISC USE 4 TIMESDAILY 100 each 4    simvastatin (ZOCOR) 20 MG tablet TAKE 1 TABLET BY MOUTH EVERY DAY 90 tablet 3    blood glucose test strips (ONETOUCH VERIO) strip USE AS DIRECTED TWICE DAILY AS NEEDED. DIAGNOSIS CODE E 11.40 *NEED PART B* 200 strip 5    cloNIDine (CATAPRES) 0.1 MG tablet TAKE 1 TABLET BY MOUTH TWICE A  tablet 2    amLODIPine (NORVASC) 5 MG tablet TAKE 1 TABLET BY MOUTH TWICE A  tablet 3    ONE TOUCH ULTRASOFT LANCETS MISC 1 each by Does not apply route 2 times daily 200 each 3    Multiple Vitamins-Minerals (CENTRUM SILVER) TABS Take  by mouth daily.  aspirin 81 MG chewable tablet Take 81 mg by mouth daily.         DULoxetine (CYMBALTA) 20 MG extended release capsule Take 1 capsule by mouth daily 90 capsule 3     No current facility-administered medications for this visit. Review of Systems    Scanned, reviewed    Vitals:    07/22/21 1335   BP: 136/70   Pulse: 65   SpO2: 90%       Constitutional: Well-developed, appears stated age, cooperative, in no acute distress  H/E/N/M/T:atraumatic, normocephalic, external ears, nose, lips normal without lesions  Eyes: Lids, lashes, conjunctivae and sclerae normal, No proptosis, no redness  Neck: supple, symmetrical, no swelling  Skin: No obvious rashes or lesions present. Skin and hair texture normal  Psychiatric: Judgement and Insight:  judgement and insight appear normal  Neuro: Normal without focal findings, speech is normal normal, speech is spontaneous  Chest: No labored breathing, no chest deformity, no stridor  Musculoskeletal: No joint deformity, swelling        8/19  Skeletal foot exam is normal, no skin lesions, toenails are normal,10 g monofilament is 5/10 on the right and 3/10 on the left    Lab Reviewed   No components found for: CHLPL  Lab Results   Component Value Date    TRIG 147 01/09/2020    TRIG 130 10/02/2013    TRIG 156 (H) 05/01/2012     Lab Results   Component Value Date    HDL 45 01/09/2020    HDL 41 10/02/2013    HDL 47 05/01/2012     Lab Results   Component Value Date    LDLCALC 87 01/09/2020    LDLCALC 75 10/02/2013    LDLCALC 76 05/01/2012     Lab Results   Component Value Date    LABVLDL 29 01/09/2020    LABVLDL 26 10/02/2013    LABVLDL 31 05/01/2012     Lab Results   Component Value Date    LABA1C 6.9 02/18/2021       Assessment:     Cabrera Johnson is a 72 y.o. female with :    1.T2DM:Longstanding, well controlled, insulin resistant. She was eating more CHO,  advised low CHo diet, continue levemir,may add GLP-1 agonist . A1c at goal- no change in dose  2. HTN: BP at goal   3. HLD:last LDL at goal  4. Obesity  5. Microalbuminuria: recheck elevated, on lisinopril  6. Neuropathy: states has pain at night,  started low dose neurontin but had side effect, discussed lyrica/amitryptiline, would like to wait  She was started back on it by PCP, working now  7. Hypercalcemia: Mild, PTH not completely suppressed, may have mild primary hyperparathyroidism. Nl  vitamin D. Plan:      levemir 50 unit BID    switch to NPH if levemir too costly for patient    metformin ER 1000mg BID   Advise to check blood sugar 2-3 times a day   Patient to send blood sugar log for titration. Advise to exercise regularly but can not due to hip pain,Advise to low simple carbohydrate and protein with each  meal diet. 30gm with meal   Diabetes Care: recommend yearly eye exam, foot exam and urine microalbumin to   creatinine ratio.  Patient is up-to-date.       -Hyperlipidemia, LDL goal is <100 mg/dl   -Hypertension:Continue same  -Daily ASA:Takes  -Smoking status:Non smoker    4 months

## 2021-08-11 RX ORDER — INSULIN DETEMIR 100 [IU]/ML
INJECTION, SOLUTION SUBCUTANEOUS
Qty: 5 PEN | Refills: 1 | Status: SHIPPED | OUTPATIENT
Start: 2021-08-11 | End: 2021-10-01

## 2021-10-01 RX ORDER — INSULIN DETEMIR 100 [IU]/ML
INJECTION, SOLUTION SUBCUTANEOUS
Qty: 5 PEN | Refills: 1 | Status: SHIPPED | OUTPATIENT
Start: 2021-10-01 | End: 2021-11-15

## 2021-10-01 NOTE — TELEPHONE ENCOUNTER
Medication:   Requested Prescriptions     Pending Prescriptions Disp Refills    LEVEMIR FLEXTOUCH 100 UNIT/ML injection pen [Pharmacy Med Name: Ita Pickett 100 UNIT/ML] 5 pen 1     Sig: INJECT 50 UNITS SUBCUTANEOUSLY TWICE A DAY       Last Filled:      Patient Phone Number: [phone] (home)     Last appt: 4/9/2021   Next appt: 12/02/2021  Last Labs DM:   Lab Results   Component Value Date    LABA1C 6.8 07/22/2021

## 2021-11-15 RX ORDER — INSULIN DETEMIR 100 [IU]/ML
INJECTION, SOLUTION SUBCUTANEOUS
Qty: 5 PEN | Refills: 1 | Status: SHIPPED | OUTPATIENT
Start: 2021-11-15 | End: 2021-12-13

## 2021-11-15 NOTE — TELEPHONE ENCOUNTER
NOV 12/02/2021    Pt would like a month supply. please advise?            Requested Prescriptions     Pending Prescriptions Disp Refills    LEVEMIR FLEXTOUCH 100 UNIT/ML injection pen [Pharmacy Med Name: Matilde Hansen 100 UNIT/ML] 5 pen 1     Sig: INJECT 50 UNITS SUBCUTANEOUSLY TWICE A DAY

## 2021-11-18 RX ORDER — PEN NEEDLE, DIABETIC 32GX 5/32"
NEEDLE, DISPOSABLE MISCELLANEOUS
Qty: 100 EACH | Refills: 4 | Status: SHIPPED | OUTPATIENT
Start: 2021-11-18

## 2021-12-02 ENCOUNTER — OFFICE VISIT (OUTPATIENT)
Dept: ENDOCRINOLOGY | Age: 71
End: 2021-12-02
Payer: MEDICARE

## 2021-12-02 VITALS
HEART RATE: 74 BPM | DIASTOLIC BLOOD PRESSURE: 74 MMHG | OXYGEN SATURATION: 93 % | BODY MASS INDEX: 42.78 KG/M2 | SYSTOLIC BLOOD PRESSURE: 130 MMHG | HEIGHT: 65 IN | WEIGHT: 256.8 LBS

## 2021-12-02 DIAGNOSIS — I10 PRIMARY HYPERTENSION: ICD-10-CM

## 2021-12-02 DIAGNOSIS — Z79.4 CONTROLLED TYPE 2 DIABETES MELLITUS WITH COMPLICATION, WITH LONG-TERM CURRENT USE OF INSULIN (HCC): Primary | ICD-10-CM

## 2021-12-02 DIAGNOSIS — E11.8 CONTROLLED TYPE 2 DIABETES MELLITUS WITH COMPLICATION, WITH LONG-TERM CURRENT USE OF INSULIN (HCC): Primary | ICD-10-CM

## 2021-12-02 LAB — HBA1C MFR BLD: 6.7 %

## 2021-12-02 PROCEDURE — 83036 HEMOGLOBIN GLYCOSYLATED A1C: CPT | Performed by: INTERNAL MEDICINE

## 2021-12-02 PROCEDURE — 2022F DILAT RTA XM EVC RTNOPTHY: CPT | Performed by: INTERNAL MEDICINE

## 2021-12-02 PROCEDURE — G8427 DOCREV CUR MEDS BY ELIG CLIN: HCPCS | Performed by: INTERNAL MEDICINE

## 2021-12-02 PROCEDURE — 1123F ACP DISCUSS/DSCN MKR DOCD: CPT | Performed by: INTERNAL MEDICINE

## 2021-12-02 PROCEDURE — 4040F PNEUMOC VAC/ADMIN/RCVD: CPT | Performed by: INTERNAL MEDICINE

## 2021-12-02 PROCEDURE — 99214 OFFICE O/P EST MOD 30 MIN: CPT | Performed by: INTERNAL MEDICINE

## 2021-12-02 PROCEDURE — G8417 CALC BMI ABV UP PARAM F/U: HCPCS | Performed by: INTERNAL MEDICINE

## 2021-12-02 PROCEDURE — 1036F TOBACCO NON-USER: CPT | Performed by: INTERNAL MEDICINE

## 2021-12-02 PROCEDURE — 1090F PRES/ABSN URINE INCON ASSESS: CPT | Performed by: INTERNAL MEDICINE

## 2021-12-02 PROCEDURE — 3044F HG A1C LEVEL LT 7.0%: CPT | Performed by: INTERNAL MEDICINE

## 2021-12-02 PROCEDURE — G8399 PT W/DXA RESULTS DOCUMENT: HCPCS | Performed by: INTERNAL MEDICINE

## 2021-12-02 PROCEDURE — G8484 FLU IMMUNIZE NO ADMIN: HCPCS | Performed by: INTERNAL MEDICINE

## 2021-12-02 PROCEDURE — 3017F COLORECTAL CA SCREEN DOC REV: CPT | Performed by: INTERNAL MEDICINE

## 2021-12-02 NOTE — PROGRESS NOTES
mellitus [facility] ([facility] 93/91/8448     Past Surgical History:   Procedure Laterality Date    [facility]ENDECTOMY  11/12/2017    [facility]    CHOLECYSTECTOMY      COLONOSCOPY      x`s 2-3    COLONOSCOPY N/A 2/12/2020    Dr. Jessica Oropeza, 6 polyps removed, repeat in 2023    HYSTERECTOMY, TOTAL ABDOMINAL       Current Outpatient Medications   Medication Sig Dispense Refill    allopurinol (ZYLOPRIM) 100 MG tablet TAKE 1 TABLET BY MOUTH EVERY DAY 90 tablet 1    Insulin Pen Needle (BD PEN NEEDLE ELIZABETH 2ND GEN) 32G X 4 MM MISC USE 4 TIMESDAILY 100 each 4    LEVEMIR FLEXTOUCH 100 UNIT/ML injection pen INJECT 50 UNITS SUBCUTANEOUSLY TWICE A DAY 5 pen 1    gabapentin (NEURONTIN) 100 MG capsule TAKE 2 CAPSULES BY MOUTH NIGHTLY FOR 90 DAYS. 180 capsule 0    gemfibrozil (LOPID) 600 MG tablet TAKE 1 TABLET BY MOUTH TWICE A  tablet 1    cloNIDine (CATAPRES) 0.1 MG tablet TAKE 1 TABLET BY MOUTH TWICE A  tablet 2    amLODIPine (NORVASC) 5 MG tablet TAKE 1 TABLET BY MOUTH TWICE A  tablet 3    lisinopril (PRINIVIL;ZESTRIL) 40 MG tablet TAKE 1 TABLET BY MOUTH EVERY DAY 90 tablet 1    allopurinol (ZYLOPRIM) 300 MG tablet TAKE 1 TABLET BY MOUTH EVERY DAY 90 tablet 1    furosemide (LASIX) 20 MG tablet TAKE 1 TABLET BY MOUTH EVERY DAY 90 tablet 1    spironolactone (ALDACTONE) 25 MG tablet TAKE 1 TABLET BY MOUTH EVERY DAY 90 tablet 3    metFORMIN (GLUCOPHAGE-XR) 500 MG extended release tablet TAKE 2 TABLETS BY MOUTH TWICE A  tablet 3    simvastatin (ZOCOR) 20 MG tablet TAKE 1 TABLET BY MOUTH EVERY DAY 90 tablet 3    blood glucose test strips (ONETOUCH VERIO) strip USE AS DIRECTED TWICE DAILY AS NEEDED. DIAGNOSIS CODE E 11.40 *NEED PART B* 200 strip 5    ONE TOUCH ULTRASOFT LANCETS MISC 1 each by Does not apply route 2 times daily 200 each 3    Multiple Vitamins-Minerals (CENTRUM SILVER) TABS Take  by mouth daily.  aspirin 81 MG chewable tablet Take 81 mg by mouth daily.         DULoxetine (CYMBALTA) 20 MG extended release capsule Take 1 capsule by mouth daily 90 capsule 3     No current facility-administered medications for this visit. Review of Systems    Scanned, reviewed    Vitals:    12/02/21 1621   BP: 130/74   Pulse: 74   SpO2: 93%       Constitutional: Well-developed, appears stated age, cooperative, in no acute distress  H/E/N/M/T:atraumatic, normocephalic, external ears, nose, lips normal without lesions  Eyes: Lids, lashes, conjunctivae and sclerae normal, No proptosis, no redness  Neck: supple, symmetrical, no swelling  Skin: No obvious rashes or lesions present. Skin and hair texture normal  Psychiatric: Judgement and Insight:  judgement and insight appear normal  Neuro: Normal without focal findings, speech is normal normal, speech is spontaneous  Chest: No labored breathing, no chest deformity, no stridor  Musculoskeletal: No joint deformity, swelling      12/21  Skeletal foot exam is normal, no skin lesions, toenails are normal,10 g monofilament is decreased    Lab Reviewed   No components found for: CHLPL  Lab Results   Component Value Date    TRIG 147 01/09/2020    TRIG 130 10/02/2013    TRIG 156 (H) 05/01/2012     Lab Results   Component Value Date    HDL 45 01/09/2020    HDL 41 10/02/2013    HDL 47 05/01/2012     Lab Results   Component Value Date    LDLCALC 87 01/09/2020    LDLCALC 75 10/02/2013    LDLCALC 76 05/01/2012     Lab Results   Component Value Date    LABVLDL 29 01/09/2020    LABVLDL 26 10/02/2013    LABVLDL 31 05/01/2012     Lab Results   Component Value Date    LABA1C 6.8 07/22/2021       Assessment:     Danyelle Zuniga is a 80 y.o. female with :    1.T2DM:Longstanding, well controlled, insulin resistant. She was eating more CHO,  advised low CHo diet, continue levemir,may add GLP-1 agonist . A1c at goal- no change in dose  2. HTN: BP at goal   3. HLD:last LDL at goal  4. Obesity  5. Microalbuminuria: recheck elevated, on lisinopril  6. Neuropathy: states has pain at night,  started low dose neurontin but had side effect, discussed lyrica/amitryptiline, would like to wait  She was started back on it by PCP, working now  7. Hypercalcemia: Mild, PTH not completely suppressed, may have mild primary hyperparathyroidism. Nl  vitamin D. Plan:      levemir 50 unit BID    switch to NPH if levemir too costly for patient    metformin ER 1000mg BID   Advise to check blood sugar 2-3 times a day   Patient to send blood sugar log for titration. Advise to exercise regularly but can not due to hip pain,Advise to low simple carbohydrate and protein with each  meal diet. 30gm with meal   Diabetes Care: recommend yearly eye exam, foot exam and urine microalbumin to   creatinine ratio.  Patient is up-to-date.       -Hyperlipidemia, LDL goal is <100 mg/dl   -Hypertension:Continue same  -Daily ASA:Takes  -Smoking status:Non smoker    4 months

## 2021-12-13 DIAGNOSIS — E11.8 CONTROLLED TYPE 2 DIABETES MELLITUS WITH COMPLICATION, WITH LONG-TERM CURRENT USE OF INSULIN (HCC): Primary | ICD-10-CM

## 2021-12-13 DIAGNOSIS — Z79.4 CONTROLLED TYPE 2 DIABETES MELLITUS WITH COMPLICATION, WITH LONG-TERM CURRENT USE OF INSULIN (HCC): Primary | ICD-10-CM

## 2021-12-13 RX ORDER — INSULIN DETEMIR 100 [IU]/ML
INJECTION, SOLUTION SUBCUTANEOUS
Qty: 5 PEN | Refills: 1 | Status: SHIPPED | OUTPATIENT
Start: 2021-12-13 | End: 2022-01-17

## 2021-12-13 NOTE — TELEPHONE ENCOUNTER
Medication:   Requested Prescriptions     Pending Prescriptions Disp Refills    LEVEMIR FLEXTOUCH 100 UNIT/ML injection pen [Pharmacy Med Name: Tanika Batres 100 UNIT/ML] 5 pen 1     Sig: INJECT 50 UNITS SUBCUTANEOUSLY TWICE A DAY       Last Filled:  11/15/2021    Patient Phone Number: [phone] (home)     Last appt: 4/9/2021   Next appt: 04/14/2022    Last Labs DM:   Lab Results   Component Value Date    LABA1C 6.7 12/02/2021

## 2022-01-15 DIAGNOSIS — E11.8 CONTROLLED TYPE 2 DIABETES MELLITUS WITH COMPLICATION, WITH LONG-TERM CURRENT USE OF INSULIN (HCC): ICD-10-CM

## 2022-01-15 DIAGNOSIS — Z79.4 CONTROLLED TYPE 2 DIABETES MELLITUS WITH COMPLICATION, WITH LONG-TERM CURRENT USE OF INSULIN (HCC): ICD-10-CM

## 2022-01-17 RX ORDER — INSULIN DETEMIR 100 [IU]/ML
INJECTION, SOLUTION SUBCUTANEOUS
Qty: 10 PEN | Refills: 3 | Status: SHIPPED | OUTPATIENT
Start: 2022-01-17 | End: 2022-06-03

## 2022-03-08 ENCOUNTER — HOSPITAL ENCOUNTER (OUTPATIENT)
Dept: MRI IMAGING | Age: 72
Discharge: HOME | End: 2022-03-08
Payer: MEDICARE

## 2022-03-08 DIAGNOSIS — N28.9 RENAL LESION: ICD-10-CM

## 2022-03-08 DIAGNOSIS — N28.1 CYST OF LEFT KIDNEY: ICD-10-CM

## 2022-03-08 PROCEDURE — 6360000004 HC RX CONTRAST MEDICATION: Performed by: STUDENT IN AN ORGANIZED HEALTH CARE EDUCATION/TRAINING PROGRAM

## 2022-03-08 PROCEDURE — 74183 MRI ABD W/O CNTR FLWD CNTR: CPT

## 2022-03-08 PROCEDURE — A9577 INJ MULTIHANCE: HCPCS | Performed by: STUDENT IN AN ORGANIZED HEALTH CARE EDUCATION/TRAINING PROGRAM

## 2022-03-08 RX ADMIN — GADOBENATE DIMEGLUMINE 20 ML: 529 INJECTION, SOLUTION INTRAVENOUS at 13:58

## 2022-04-14 ENCOUNTER — OFFICE VISIT (OUTPATIENT)
Dept: ENDOCRINOLOGY | Age: 72
End: 2022-04-14
Payer: MEDICARE

## 2022-04-14 VITALS
HEIGHT: 65 IN | WEIGHT: 259.4 LBS | SYSTOLIC BLOOD PRESSURE: 148 MMHG | DIASTOLIC BLOOD PRESSURE: 62 MMHG | BODY MASS INDEX: 43.22 KG/M2 | OXYGEN SATURATION: 91 % | HEART RATE: 86 BPM

## 2022-04-14 DIAGNOSIS — Z79.4 CONTROLLED TYPE 2 DIABETES MELLITUS WITH COMPLICATION, WITH LONG-TERM CURRENT USE OF INSULIN (HCC): Primary | ICD-10-CM

## 2022-04-14 DIAGNOSIS — E11.8 CONTROLLED TYPE 2 DIABETES MELLITUS WITH COMPLICATION, WITH LONG-TERM CURRENT USE OF INSULIN (HCC): Primary | ICD-10-CM

## 2022-04-14 DIAGNOSIS — R80.9 MICROALBUMINURIA: ICD-10-CM

## 2022-04-14 DIAGNOSIS — E27.8 ADRENAL NODULE (HCC): ICD-10-CM

## 2022-04-14 LAB — HBA1C MFR BLD: 6.7 %

## 2022-04-14 PROCEDURE — 1123F ACP DISCUSS/DSCN MKR DOCD: CPT | Performed by: INTERNAL MEDICINE

## 2022-04-14 PROCEDURE — 83036 HEMOGLOBIN GLYCOSYLATED A1C: CPT | Performed by: INTERNAL MEDICINE

## 2022-04-14 PROCEDURE — 1036F TOBACCO NON-USER: CPT | Performed by: INTERNAL MEDICINE

## 2022-04-14 PROCEDURE — G8417 CALC BMI ABV UP PARAM F/U: HCPCS | Performed by: INTERNAL MEDICINE

## 2022-04-14 PROCEDURE — 4040F PNEUMOC VAC/ADMIN/RCVD: CPT | Performed by: INTERNAL MEDICINE

## 2022-04-14 PROCEDURE — 2022F DILAT RTA XM EVC RTNOPTHY: CPT | Performed by: INTERNAL MEDICINE

## 2022-04-14 PROCEDURE — 3017F COLORECTAL CA SCREEN DOC REV: CPT | Performed by: INTERNAL MEDICINE

## 2022-04-14 PROCEDURE — G8399 PT W/DXA RESULTS DOCUMENT: HCPCS | Performed by: INTERNAL MEDICINE

## 2022-04-14 PROCEDURE — 1090F PRES/ABSN URINE INCON ASSESS: CPT | Performed by: INTERNAL MEDICINE

## 2022-04-14 PROCEDURE — 3046F HEMOGLOBIN A1C LEVEL >9.0%: CPT | Performed by: INTERNAL MEDICINE

## 2022-04-14 PROCEDURE — 99214 OFFICE O/P EST MOD 30 MIN: CPT | Performed by: INTERNAL MEDICINE

## 2022-04-14 PROCEDURE — G8427 DOCREV CUR MEDS BY ELIG CLIN: HCPCS | Performed by: INTERNAL MEDICINE

## 2022-04-14 NOTE — PROGRESS NOTES
Seen as f/u patient for diabetes       Interim;   Glucose stable  One low glucose  MRI 3/22 showed right 2.4cm adrenal adenoma, stable  Left 1.3cm seen on previous CT  2017 CT no adrenal nodule  No hormonal testing    She had hypercalcemia noted on labs  10/20 Ca 10.0 PTH 56  2/21 Ca 10.7     She is off MVT now    Diagnosed with Type 2 diabetes mellitus > 5   years  Known diabetic complications: none     Current diabetic medications   Levemir 50 units BID  Metformin ER 1gm BID    Moderate, controlled, no worsening factors    Intolerance to diabetes medications: yes - Metformin -bad taste in mouth    Last A1c 6.7%<-----6.8%<-----6.9%<---- 6.5%<-----6.7%<------7%<-----7.2%<------- 6.3%<------5.5%<------- 8.1 on 4/17<------6.9 on 7/16<------- 6.9 on 4/16<-----5.9 on 1/16<----- 6.4 on 10/15<------ 6.9 on 6/15<-----5.9 on 2/15<----7.4 on 10/13<---- 8.8 on 5/13<---9.2<---9.6    Prior visit with dietician: No  Current diet: on average, 3 meals per day trying to watch  Current exercise: walking   Current monitoring regimen: home blood tests - 1-2 times daily     Has brought blood glucose log/meter: yes  Home blood sugar records:  Any episodes of hypoglycemia 58    No Hx of CAD , PVD, CVA    Hyperlipidemia:for years, controlled  simvastatin 20mg  LDL 74 on 3/14  Gemfibrozil 600 BID  LDL 87 on 1/20  Last eye exam: 5/21  Last foot exam:12/21    She had some microalbuminuria  Last microalbumin to creatinine ratio: 53.8 on 6/14---> 9/14 nl, 2/16 nl---> 34.9 on 9/17---> 139 on 12/18---> 139 on 12/19---> 154.2 on 2/21    HTN: For years, taking medication, stable  norvasc 5 BID  lisinopril 40mg aldactone 25 clonidine 0.1 BID     Past Medical History:   Diagnosis Date    Arthritis     Chronic kidney disease (CKD) stage G3a/A3, moderately decreased glomerular filtration rate (GFR) between 45-59 mL/min/1.73 square meter and albuminuria creatinine ratio greater than 300 mg/g (HCC)     Diverticulitis     Essential hypertension 07/01/2011    Hyperlipidemia     Neuropathy     PONV (postoperative nausea and vomiting)     only hysterectomy    Renal cyst     [facility] 24/15/0177     Past Surgical History:   Procedure Laterality Date    APPENDECTOMY  11/12/2017    [facility]    CHOLECYSTECTOMY      COLONOSCOPY      x`s 2-3    COLONOSCOPY N/A 2/12/2020    Dr. Emily Delgadillo, 6 polyps removed, repeat in 2023    HYSTERECTOMY, TOTAL ABDOMINAL       Current Outpatient Medications   Medication Sig Dispense Refill    simvastatin (ZOCOR) 20 MG tablet TAKE 1 TABLET BY MOUTH EVERY DAY 90 tablet 0    tiZANidine (ZANAFLEX) 4 MG tablet Take 1 tablet by mouth every 8 hours as needed (back pain) 30 tablet 0    LEVEMIR FLEXTOUCH 100 UNIT/ML injection pen INJECT 50 UNITS SUBCUTANEOUSLY TWICE A DAY 10 pen 3    allopurinol (ZYLOPRIM) 300 MG tablet TAKE 1 TABLET BY MOUTH EVERY DAY 90 tablet 1    lisinopril (PRINIVIL;ZESTRIL) 40 MG tablet TAKE 1 TABLET BY MOUTH EVERY DAY 90 tablet 1    allopurinol (ZYLOPRIM) 100 MG tablet TAKE 1 TABLET BY MOUTH EVERY DAY 90 tablet 1    Insulin Pen Needle (BD PEN NEEDLE ELIZABETH 2ND GEN) 32G X 4 MM MISC USE 4 TIMESDAILY 100 each 4    gemfibrozil (LOPID) 600 MG tablet TAKE 1 TABLET BY MOUTH TWICE A  tablet 1    cloNIDine (CATAPRES) 0.1 MG tablet TAKE 1 TABLET BY MOUTH TWICE A  tablet 2    amLODIPine (NORVASC) 5 MG tablet TAKE 1 TABLET BY MOUTH TWICE A  tablet 3    furosemide (LASIX) 20 MG tablet TAKE 1 TABLET BY MOUTH EVERY DAY 90 tablet 1    spironolactone (ALDACTONE) 25 MG tablet TAKE 1 TABLET BY MOUTH EVERY DAY 90 tablet 3    metFORMIN (GLUCOPHAGE-XR) 500 MG extended release tablet TAKE 2 TABLETS BY MOUTH TWICE A  tablet 3    blood glucose test strips (ONETOUCH VERIO) strip USE AS DIRECTED TWICE DAILY AS NEEDED.  DIAGNOSIS CODE E 11.40 *NEED PART B* 200 strip 5    ONE TOUCH ULTRASOFT LANCETS MISC 1 each by Does not apply route 2 times daily 200 each 3    Multiple Vitamins-Minerals (CENTRUM SILVER) TABS Take  by mouth daily.  aspirin 81 MG chewable tablet Take 81 mg by mouth daily.  gabapentin (NEURONTIN) 100 MG capsule TAKE 2 CAPSULES BY MOUTH NIGHTLY FOR 90 DAYS. 180 capsule 0    DULoxetine (CYMBALTA) 20 MG extended release capsule Take 1 capsule by mouth daily 90 capsule 3     No current facility-administered medications for this visit. Review of Systems    Scanned, reviewed    Vitals:    04/14/22 1456   BP: (!) 148/62   Pulse: 86   SpO2: 91%       Constitutional: Well-developed, appears stated age, cooperative, in no acute distress  H/E/N/M/T:atraumatic, normocephalic, external ears, nose, lips normal without lesions  Eyes: Lids, lashes, conjunctivae and sclerae normal, No proptosis, no redness  Neck: supple, symmetrical, no swelling  Skin: No obvious rashes or lesions present. Skin and hair texture normal  Psychiatric: Judgement and Insight:  judgement and insight appear normal  Neuro: Normal without focal findings, speech is normal normal, speech is spontaneous  Chest: No labored breathing, no chest deformity, no stridor  Musculoskeletal: No joint deformity, swelling      12/21  Skeletal foot exam is normal, no skin lesions, toenails are normal,10 g monofilament is decreased    Lab Reviewed   No components found for: CHLPL  Lab Results   Component Value Date    TRIG 124 12/22/2021    TRIG 147 01/09/2020    TRIG 130 10/02/2013     Lab Results   Component Value Date    HDL 49 12/22/2021    HDL 45 01/09/2020    HDL 41 10/02/2013     Lab Results   Component Value Date    LDLCALC 92 12/22/2021    LDLCALC 87 01/09/2020    LDLCALC 75 10/02/2013     Lab Results   Component Value Date    LABVLDL 25 12/22/2021    LABVLDL 29 01/09/2020    LABVLDL 26 10/02/2013     Lab Results   Component Value Date    LABA1C 6.7 12/02/2021       Assessment:     Luis Alfredo Martinez is a 80 y.o. female with :    1.T2DM:Longstanding, well controlled, insulin resistant. She was eating more CHO,  advised low CHo diet, continue levemir,may add GLP-1 agonist . A1c at goal- Given occ low, decrease insulin dose  2. HTN: BP slightly elevated  3. HLD:last LDL at goal  4. Obesity  5. Microalbuminuria: recheck elevated, on lisinopril  6. Neuropathy: states has pain at night,  started low dose neurontin but had side effect, discussed lyrica/amitryptiline, would like to wait  She was started back on it by PCP, working now  7. Hypercalcemia: Mild, PTH not completely suppressed, may have mild primary hyperparathyroidism. Nl  vitamin D. 8.Adrenal nodule: Will do hormonal testing    Plan:      levemir 50 unit BID ---> 46/46   switch to NPH if levemir too costly for patient    metformin ER 1000mg BID   Advise to check blood sugar 2-3 times a day   Patient to send blood sugar log for titration. Advise to exercise regularly but can not due to hip pain,Advise to low simple carbohydrate and protein with each  meal diet. 30gm with meal   Diabetes Care: recommend yearly eye exam, foot exam and urine microalbumin to   creatinine ratio.  Patient is up-to-date.       -Hyperlipidemia, LDL goal is <100 mg/dl   -Hypertension:Continue same  -Daily ASA:Takes  -Smoking status:Non smoker    4 months

## 2022-05-24 DIAGNOSIS — Z79.4 CONTROLLED TYPE 2 DIABETES MELLITUS WITH COMPLICATION, WITH LONG-TERM CURRENT USE OF INSULIN (HCC): Primary | ICD-10-CM

## 2022-05-24 DIAGNOSIS — E11.8 CONTROLLED TYPE 2 DIABETES MELLITUS WITH COMPLICATION, WITH LONG-TERM CURRENT USE OF INSULIN (HCC): Primary | ICD-10-CM

## 2022-05-24 RX ORDER — METFORMIN HYDROCHLORIDE 500 MG/1
TABLET, EXTENDED RELEASE ORAL
Qty: 360 TABLET | Refills: 3 | Status: SHIPPED | OUTPATIENT
Start: 2022-05-24 | End: 2022-08-18

## 2022-05-24 NOTE — TELEPHONE ENCOUNTER
Medication:   Requested Prescriptions     Pending Prescriptions Disp Refills    metFORMIN (GLUCOPHAGE-XR) 500 MG extended release tablet [Pharmacy Med Name: METFORMIN HCL  MG TABLET] 360 tablet 3     Sig: TAKE 2 TABLETS BY MOUTH TWICE A DAY       Last Filled:      Patient Phone Number: [phone] (home)     Last appt: 4/14/2022   Next appt: 8/18/2022    Last Labs DM:   Lab Results   Component Value Date    LABA1C 6.7 04/14/2022

## 2022-06-02 DIAGNOSIS — E11.8 CONTROLLED TYPE 2 DIABETES MELLITUS WITH COMPLICATION, WITH LONG-TERM CURRENT USE OF INSULIN (HCC): ICD-10-CM

## 2022-06-02 DIAGNOSIS — Z79.4 CONTROLLED TYPE 2 DIABETES MELLITUS WITH COMPLICATION, WITH LONG-TERM CURRENT USE OF INSULIN (HCC): ICD-10-CM

## 2022-06-03 RX ORDER — INSULIN DETEMIR 100 [IU]/ML
INJECTION, SOLUTION SUBCUTANEOUS
Qty: 10 PEN | Refills: 3 | Status: SHIPPED | OUTPATIENT
Start: 2022-06-03 | End: 2022-08-29

## 2022-06-03 NOTE — TELEPHONE ENCOUNTER
Medication:   Requested Prescriptions     Pending Prescriptions Disp Refills    LEVEMIR FLEXTOUCH 100 UNIT/ML injection pen [Pharmacy Med Name: Annie He 100 UNIT/ML] 10 pen 3     Sig: INJECT 50 UNITS SUBCUTANEOUSLY TWICE A DAY       Last Filled:      Patient Phone Number: [phone] (home)     Last appt: 4/9/2021   Next appt: 08/18/2022  Last Labs DM:   Lab Results   Component Value Date    LABA1C 6.7 04/14/2022

## 2022-07-05 RX ORDER — BLOOD SUGAR DIAGNOSTIC
STRIP MISCELLANEOUS
Qty: 200 STRIP | Refills: 5 | Status: SHIPPED | OUTPATIENT
Start: 2022-07-05

## 2022-08-03 ENCOUNTER — HOSPITAL ENCOUNTER (OUTPATIENT)
Dept: WOUND CARE | Age: 72
Discharge: HOME | End: 2022-08-03
Payer: MEDICARE

## 2022-08-03 VITALS
RESPIRATION RATE: 18 BRPM | DIASTOLIC BLOOD PRESSURE: 81 MMHG | HEART RATE: 96 BPM | BODY MASS INDEX: 42.75 KG/M2 | TEMPERATURE: 96.4 F | WEIGHT: 256.62 LBS | SYSTOLIC BLOOD PRESSURE: 157 MMHG | HEIGHT: 65 IN

## 2022-08-03 DIAGNOSIS — M79.89 SWELLING OF LEFT LOWER EXTREMITY: ICD-10-CM

## 2022-08-03 DIAGNOSIS — I10 ESSENTIAL HYPERTENSION: Primary | Chronic | ICD-10-CM

## 2022-08-03 DIAGNOSIS — E11.9 TYPE 2 DIABETES MELLITUS WITHOUT COMPLICATION, WITH LONG-TERM CURRENT USE OF INSULIN (HCC): Chronic | ICD-10-CM

## 2022-08-03 DIAGNOSIS — R21 GENERALIZED RASH: ICD-10-CM

## 2022-08-03 DIAGNOSIS — Z79.4 TYPE 2 DIABETES MELLITUS WITHOUT COMPLICATION, WITH LONG-TERM CURRENT USE OF INSULIN (HCC): Chronic | ICD-10-CM

## 2022-08-03 DIAGNOSIS — R60.0 LOCALIZED EDEMA: ICD-10-CM

## 2022-08-03 PROCEDURE — 11045 DBRDMT SUBQ TISS EACH ADDL: CPT

## 2022-08-03 PROCEDURE — 99202 OFFICE O/P NEW SF 15 MIN: CPT | Performed by: NURSE PRACTITIONER

## 2022-08-03 PROCEDURE — 11045 DBRDMT SUBQ TISS EACH ADDL: CPT | Performed by: NURSE PRACTITIONER

## 2022-08-03 PROCEDURE — 99213 OFFICE O/P EST LOW 20 MIN: CPT

## 2022-08-03 PROCEDURE — 11042 DBRDMT SUBQ TIS 1ST 20SQCM/<: CPT | Performed by: NURSE PRACTITIONER

## 2022-08-03 PROCEDURE — 11042 DBRDMT SUBQ TIS 1ST 20SQCM/<: CPT

## 2022-08-03 RX ORDER — BETAMETHASONE DIPROPIONATE 0.05 %
OINTMENT (GRAM) TOPICAL ONCE
Status: CANCELLED | OUTPATIENT
Start: 2022-08-03 | End: 2022-08-03

## 2022-08-03 RX ORDER — BACITRACIN ZINC AND POLYMYXIN B SULFATE 500; 1000 [USP'U]/G; [USP'U]/G
OINTMENT TOPICAL ONCE
Status: CANCELLED | OUTPATIENT
Start: 2022-08-03 | End: 2022-08-03

## 2022-08-03 RX ORDER — LIDOCAINE 50 MG/G
OINTMENT TOPICAL ONCE
Status: CANCELLED | OUTPATIENT
Start: 2022-08-03 | End: 2022-08-03

## 2022-08-03 RX ORDER — CLOBETASOL PROPIONATE 0.5 MG/G
OINTMENT TOPICAL ONCE
Status: CANCELLED | OUTPATIENT
Start: 2022-08-03 | End: 2022-08-03

## 2022-08-03 RX ORDER — BACITRACIN, NEOMYCIN, POLYMYXIN B 400; 3.5; 5 [USP'U]/G; MG/G; [USP'U]/G
OINTMENT TOPICAL ONCE
Status: CANCELLED | OUTPATIENT
Start: 2022-08-03 | End: 2022-08-03

## 2022-08-03 RX ORDER — METHYLPREDNISOLONE 4 MG/1
TABLET ORAL
Qty: 1 KIT | Refills: 0 | Status: SHIPPED | OUTPATIENT
Start: 2022-08-03 | End: 2022-08-09

## 2022-08-03 RX ORDER — LIDOCAINE HYDROCHLORIDE 40 MG/ML
SOLUTION TOPICAL ONCE
Status: COMPLETED | OUTPATIENT
Start: 2022-08-03 | End: 2022-08-03

## 2022-08-03 RX ORDER — GENTAMICIN SULFATE 1 MG/G
OINTMENT TOPICAL ONCE
Status: CANCELLED | OUTPATIENT
Start: 2022-08-03 | End: 2022-08-03

## 2022-08-03 RX ORDER — LIDOCAINE HYDROCHLORIDE 20 MG/ML
JELLY TOPICAL ONCE
Status: CANCELLED | OUTPATIENT
Start: 2022-08-03 | End: 2022-08-03

## 2022-08-03 RX ORDER — LIDOCAINE 40 MG/G
CREAM TOPICAL ONCE
Status: CANCELLED | OUTPATIENT
Start: 2022-08-03 | End: 2022-08-03

## 2022-08-03 RX ORDER — LIDOCAINE HYDROCHLORIDE 40 MG/ML
SOLUTION TOPICAL ONCE
Status: CANCELLED | OUTPATIENT
Start: 2022-08-03 | End: 2022-08-03

## 2022-08-03 RX ORDER — GINSENG 100 MG
CAPSULE ORAL ONCE
Status: CANCELLED | OUTPATIENT
Start: 2022-08-03 | End: 2022-08-03

## 2022-08-03 RX ADMIN — LIDOCAINE HYDROCHLORIDE: 40 SOLUTION TOPICAL at 08:39

## 2022-08-03 NOTE — DISCHARGE INSTRUCTIONS
[address] and [facility] Therapy   Physician Orders and Discharge Instructions  [address]   [address], [place] [address]  Telephone: [phone]    NAME:  Jeff Bolaños OF BIRTH:  1950  MEDICAL RECORD NUMBER:  [id number]  DATE:    8/3/22    Wound Cleansing:   Do not scrub or use excessive force. Cleanse wound prior to applying a clean dressing with:  [] Normal Saline  [x] Keep Wound Dry in Shower    [] Wound Cleanser   [] Cleanse wound with Mild Soap & Water  [] May Shower at Discharge   [] Other:       Topical Treatments:  Do not apply lotions, creams, or ointments to wound bed unless directed. [] Apply moisturizing lotion to skin surrounding the wound prior to dressing change.  [] Apply antifungal ointment to skin surrounding the wound prior to dressing change.  [] Apply thin film of moisture barrier ointment to skin immediately around wound. [] Other:       Dressings:           Wound Location left lower leg    [x] Apply Primary Dressing:       [] MediHoney Gel [] Alginate with Silver [] Alginate   [] Collagen [] Collagen with Silver   [] Santyl with Moisten saline gauze     [] Hydrocolloid   [] MediHoney Alginate [] Foam with Silver   [] Foam   [] Hydrofera Blue    [] Mepilex Border    [] Moisten with Saline [] Hydrogel [] Mepitel     [] Bactroban/Mupirocin [] Polysporin  [x] Other:  XEROFORM    [x] Cover and Secure with:     [] Gauze [] Shereen [x] Roll gauze   [] Ace Wrap [] Cover Roll Tape [x] ABD     [] Other:    Avoid contact of tape with skin.     [x] Change dressing: [] Daily    [x] Every Other Day [] Three times per week   [] Once a week [] Do Not Change Dressing   [] Other:    Edema Control:  Apply: [] Compression Stocking []Right Leg []Left Leg   [] Tubigrip []Right Leg Double Layer []Left Leg Double Layer       []Right Leg Single Layer []Left Leg Single Layer   [x] SpandaGrip []Right Leg  [x]Left Leg      [x]Low compression 5-10 mm/Hg      []Medium compression 10-20 mm/Hg     []High compression  20-30 mm/Hg   every morning immediately when getting up should be applied to affected leg(s) from mid foot to knee making sure to cover the heel. Remove every night before going to bed. [] Elevate leg(s) above the level of the heart when sitting. [] Avoid prolonged standing in one place. [] Elevate arm/hand above the level of the heart []RightArm []LeftArm     Compression:  Apply: [] Multilayer Compression Wrap Applied in Clinic []RightLeg []Left Leg   [] Multi-layer compression. Do not get leg(s) with wrap wet. If wraps become too tight call the center or completely remove the wrap. [] Elevate leg(s) above the level of the heart when sitting. [] Avoid prolonged standing in one place. Activity:  [x] Activity as tolerated: If you are still having pain after you go home:  [x] Elevate the affected limb. [x] Use over-the-counter medications you would normally use for pain as permitted by your family doctor. [x] For persistent pain not relieved by the above interventions, please call your family doctor.              Return Appointment:  [] Wound and dressing supply provider:   [] ECF or Home Healthcare:     [x] Return Appointment: With Bentley Moore  in  1 Week(s)  [x] Ordered tests: VASCULAR STUDIES (Aug 11 th) AND DVT STUDY (Aug 4 th)          [facility] AT [facility]  Nurse Case Manger:  Palmer Burdick / Elvera Sandhoff     Electronically signed by Cody Cavanaugh RN on 8/3/2022 at 9:06 [address] Information: Should you experience any significant changes in your wound(s) or have questions about your wound care, please contact the [facility] Pending sale to [facility] at [address] 8:00 am - 4:30 pm and Friday 8:00 am - 12:30 pm.  If you need help with your wound outside these hours and cannot wait until we are again available, contact your PCP or go to the hospital emergency room. PLEASE NOTE: IF YOU ARE UNABLE TO OBTAIN WOUND SUPPLIES, CONTINUE TO USE THE SUPPLIES YOU HAVE AVAILABLE UNTIL YOU ARE ABLE TO REACH US. IT IS MOST IMPORTANT TO KEEP THE WOUND COVERED AT ALL TIMES.      Physician Signature:_______________________    Date: ___________ Time:  ____________        Sharda Ibarra CNP

## 2022-08-03 NOTE — LETTER
[address]  [address] OFFICE [place] [address] [facility]  [phone]  Dept: [phone]   TODAYS DATE: 8/1/2022    [address] [place] [address],4Th Floor Wound Care   Appointment Treatment Guidelines  Welcome Ms. Delgado to the [address] [place] [address] Court. We appreciate the confidence you have shown in choosing us as your wound care provider. Our goal is to heal your wound(s) as quickly as possible. Please read the items below regarding the nature of your appointments. 1. We will make every effort to schedule appointments that are convenient for you. Certain days and times may not be available, depending on your providers office hours and details of your care. 2. Patients will not necessarily be brought to an exam room in the order in which they arrive. Many providers work out of this office and patients are here for different procedures. Our goal is to serve you as quickly as possible. 3. We acknowledge that your time is valuable. Please remember that wound healing takes time and we appreciate your understanding that the length of each patients appointment will vary depending upon their need. 4. It is for your protection that we ask for insurance cards, photo ID, and new consent forms on your first visit and periodically throughout your treatment at all our facilities. 5. Wound Care treatment is known to be most effective when provided on a regular basis. Missed appointments, and not following the recommended plan of care can result in ineffective treatment and a poor outcome. If you find it difficult to keep appointments or to follow the recommended plan of care, it is your responsibility to let the staff know, so that we can work with you toward a solution. 6. If you need to miss an appointment, please call to let us know. We expect 24 hours notice for all cancellations.  We also expect missed visits to be rescheduled as soon as possible, preferably within the same week to promote the most effective healing time for your wound(s). 7. If you will be late for an appointment, please call our center to be sure that the provider can still see you when you arrive. If you are more than 15 minutes late your appointment may need to be rescheduled. 8. If two (2) appointments are missed without notifying us, your care plan may be discontinued. The same may happen if multiple visits are cancelled or rescheduled, even with notice. A missed visit is time when another patient, who also needs care, could have been seen. Thank you for your understanding and consideration.

## 2022-08-03 NOTE — LETTER
[address]  [address]  [phone]  Dept: [phone]        Thank you Ms. Danny for choosing [facility] for your Wound Care needs. We hope you found your first visit both encouraging and educational.  We look forward to serving you throughout the healing process. Before your next visit please review the information you received in your [employer]. The first visit can be overwhelming and this is a useful tool to refresh what information you may have been given. If you find yourself with any questions prior to your upcoming appointment, please feel free to contact us. Often wounds can be challenging and it is our goal to see you through the healing process with as much support possible. Again, thank you for choosing [facility],4[address] Floor!     Sincerely,      The Staff of [address] [place] [address] Pkwy and Hyper[facility]ic Oxygen Therapy

## 2022-08-04 ENCOUNTER — HOSPITAL ENCOUNTER (OUTPATIENT)
Dept: VASCULAR LAB | Age: 72
Discharge: HOME | End: 2022-08-04
Payer: MEDICARE

## 2022-08-04 PROCEDURE — 93971 EXTREMITY STUDY: CPT

## 2022-08-04 NOTE — PROGRESS NOTES
[address]   Progress Note and Procedure Note      Danyelle Zuniga  MEDICAL RECORD NUMBER:  [id number]  AGE: 80 y.o. GENDER: female  : 1950  EPISODE DATE:  8/3/2022    Subjective:     Chief Complaint   Patient presents with    Wound Check     Left lower leg         HISTORY of PRESENT ILLNESS HPI     Danyelle Zuniga is a 80 y.o. female who presents today for wound/ulcer evaluation of left leg redness, blistering and generalized pruritic rash. Pt was trying steroid cream to areas of rash without success. Was put on Keflex after rash appeared and has noted decrease in swelling or blisters. Pt reports had injured left lower leg on car door in last few months and since left leg has remained more swollen. Denies residual pain from injury from car door. Pt is very hard of hearing. For the past few days, she has had progressive extremity. She injured her leg a few days ago. she does not report any fever, chills or nausea or vomiting. Over the past 2 days she has developed 2 blisters in the back of her legs. She does have difficulty with chronic lower extremity swelling. History of Wound Context: ~22 pt noted redness, swelling and blister development left lower leg. Before that she noticed a generalized flat red, pruritic rash. Wound/Ulcer Pain Timing/Severity: intermittent, moderate  Quality of pain: tender  Severity:  3 / 10   Modifying Factors:  most pain left lower leg and itching from rash  Associated Signs/Symptoms: edema, erythema, drainage, and pain    Ulcer Identification:  Ulcer Type: venous    Contributing Factors: edema, venous stasis, diabetes, obesity, and exposure to cleaning products specifically [employer]. She and her  cleaning basement to put house up for sale. Reports mold in basement.      Acute Wound: Blister    PAST MEDICAL HISTORY        Diagnosis Date    Arthritis     Chronic kidney disease (CKD) stage G3a/A3, moderately cloNIDine (CATAPRES) 0.1 MG tablet TAKE 1 TABLET BY MOUTH TWICE A  tablet 2    lisinopril (PRINIVIL;ZESTRIL) 40 MG tablet TAKE 1 TABLET BY MOUTH EVERY DAY 90 tablet 1    simvastatin (ZOCOR) 20 MG tablet TAKE 1 TABLET BY MOUTH EVERY DAY 90 tablet 0    allopurinol (ZYLOPRIM) 100 MG tablet TAKE 1 TABLET BY MOUTH EVERY DAY 90 tablet 1    allopurinol (ZYLOPRIM) 300 MG tablet TAKE 1 TABLET BY MOUTH EVERY DAY 90 tablet 1    spironolactone (ALDACTONE) 25 MG tablet TAKE 1 TABLET BY MOUTH EVERY DAY 90 tablet 3    metFORMIN (GLUCOPHAGE-XR) 500 MG extended release tablet TAKE 2 TABLETS BY MOUTH TWICE A  tablet 3    gemfibrozil (LOPID) 600 MG tablet TAKE 1 TABLET BY MOUTH TWICE A  tablet 1    tiZANidine (ZANAFLEX) 4 MG tablet Take 1 tablet by mouth every 8 hours as needed (back pain) 30 tablet 0    Insulin Pen Needle (BD PEN NEEDLE ELIZABETH 2ND GEN) 32G X 4 MM MISC USE 4 TIMESDAILY 100 each 4    furosemide (LASIX) 20 MG tablet TAKE 1 TABLET BY MOUTH EVERY DAY 90 tablet 1    DULoxetine (CYMBALTA) 20 MG extended release capsule Take 1 capsule by mouth daily 90 capsule 3    ONE TOUCH ULTRASOFT LANCETS MISC 1 each by Does not apply route 2 times daily 200 each 3    Multiple Vitamins-Minerals (CENTRUM SILVER) TABS Take  by mouth daily. aspirin 81 MG chewable tablet Take 81 mg by mouth daily. No current facility-administered medications on file prior to encounter. REVIEW OF SYSTEMS  Review of Systems    Pertinent items are noted in HPI.     Objective:      BP (!) 157/81   Pulse 96   Temp (!) 96.4 °F (35.8 °C) (Infrared)   Resp 18   Ht 5' 5\" (1.651 m)   Wt 256 lb 9.9 oz (116.4 kg)   LMP  (LMP Unknown)   BMI 42.70 kg/m²     Wt Readings from Last 3 Encounters:   08/03/22 256 lb 9.9 oz (116.4 kg)   08/03/22 256 lb (116.1 kg)   07/29/22 262 lb 6.4 oz (119 kg)       PHYSICAL EXAM  Physical Exam    General Appearance: alert and oriented to person, place and time, well-developed and well-nourished, in no acute distress, with anxious affect, obese, and pale  Skin: warm and dry and rash noted- on bilateral lower legs, lower arms and around neckline, but itching has \"spread\" to area to back  Head: normocephalic and atraumatic  Eyes: pupils equal, round, and reactive to light, extraocular eye movements intact, conjunctivae normal  Pulmonary/Chest: clear to auscultation bilaterally- no wheezes, rales or rhonchi, normal air movement, no respiratory distress  Cardiovascular: normal rate, regular rhythm, and intact distal pulses  Extremities: no cyanosis, no clubbing, and 1 + edema-  bilateral lower legs with left > right. Large serous filled blisters posterior left lower leg and small intact blister anterior lower leg      Assessment:        Problem List Items Addressed This Visit       Generalized rash    Relevant Orders    VL DUP LOWER EXTREMITY VENOUS LEFT    VL Reflux Mohinder Insufficiency Stdy Left    Swelling of left lower extremity    Relevant Orders    VL DUP LOWER EXTREMITY VENOUS LEFT    VL Reflux Mohinder Insufficiency Stdy Left    Essential hypertension - Primary (Chronic)    Edema    Relevant Orders    VL DUP LOWER EXTREMITY VENOUS LEFT    VL Reflux Mohinder Insufficiency Stdy Left    RESOLVED: Type 2 diabetes mellitus without complication (HCC) (Chronic)        Procedure Note  Indications:  Based on my examination of this patient's wound(s)/ulcer(s) today, debridement is required to promote healing and evaluate the wound base. Performed by: SELINA Hanson - CNP    Consent obtained:  Yes    Time out taken:  Yes    Pain Control: Anesthetic  Anesthetic: 4% Lidocaine Liquid Topical       Debridement: Excisional Debridement    Using curette and forceps the wound(s)/ulcer(s) was/were debrided down through and including the removal of epidermis, dermis, and subcutaneous tissue.         Devitalized Tissue Debrided:  fibrin, biofilm, slough, and exudate    Pre Debridement Measurements:  Are located in the [facility]  Documentation Flow Sheet    Diabetic/Pressure/Non Pressure Ulcers only:  Ulcer: Non-Pressure ulcer, fat layer exposed     Wound/Ulcer #: 1    Post Debridement Measurements:  Wound/Ulcer Descriptions are Pre Debridement except measurements:    Wound 08/03/22 Left #1 initial left lower leg (Active)   Wound Image   08/03/22 0839   Dressing/Treatment Xeroform;ABD;Roll gauze 08/03/22 0930   Wound Length (cm) 11 cm 08/03/22 0839   Wound Width (cm) 24.5 cm 08/03/22 0839   Wound Depth (cm) 0.2 cm 08/03/22 0839   Wound Surface Area (cm^2) 269.5 cm^2 08/03/22 0839   Wound Volume (cm^3) 53.9 cm^3 08/03/22 0839   Post-Procedure Length (cm) 11.1 cm 08/03/22 0852   Post-Procedure Width (cm) 24.6 cm 08/03/22 0852   Post-Procedure Depth (cm) 0.3 cm 08/03/22 0852   Post-Procedure Surface Area (cm^2) 273.06 cm^2 08/03/22 0852   Post-Procedure Volume (cm^3) 81.918 cm^3 08/03/22 0852   Wound Assessment Ruptured blister;Fluid filled blister 08/03/22 0839   Drainage Amount Moderate 08/03/22 0839   Drainage Description Serous 08/03/22 0839   Odor None 08/03/22 0839   Delicia-wound Assessment Blanchable erythema 08/03/22 0839   Margins Undefined edges 08/03/22 0839   Wound Thickness Description not for Pressure Injury Full thickness 08/03/22 0839   Number of days: 1          Total Surface Area Debrided:  136.53 sq cm     Estimated Blood Loss:  Minimal    Hemostasis Achieved:  not needed    Procedural Pain:  3  / 10     Post Procedural Pain:  2 / 10     Response to treatment:  Well tolerated by patient., With complaints of pain. Plan:   Lengthy discussion with pt and her  regarding potential causes of wounds and rash and plan of care. Discussed use of oral steroids dose pack and effect on blood sugars and caution with kidney function. Pt to follow-up with PCP. Pt agreeable and questions answered.   Treatment Note please see attached Discharge Instructions    Written patient dismissal instructions given to patient and signed by patient or POA. Discharge Instructions         [address] and [facility]   Physician Orders and Discharge Instructions  [address]   [address], [place] [address]  Telephone: [phone]    NAME:  Naveen El OF BIRTH:  1950  MEDICAL RECORD NUMBER:  [id number]  DATE:    8/3/22    Wound Cleansing:   Do not scrub or use excessive force. Cleanse wound prior to applying a clean dressing with:  [] Normal Saline  [x] Keep Wound Dry in Shower    [] Wound Cleanser   [] Cleanse wound with Mild Soap & Water  [] May Shower at Discharge   [] Other:       Topical Treatments:  Do not apply lotions, creams, or ointments to wound bed unless directed. [] Apply moisturizing lotion to skin surrounding the wound prior to dressing change.  [] Apply antifungal ointment to skin surrounding the wound prior to dressing change.  [] Apply thin film of moisture barrier ointment to skin immediately around wound. [] Other:       Dressings:           Wound Location left lower leg    [x] Apply Primary Dressing:       [] MediHoney Gel [] Alginate with Silver [] Alginate   [] Collagen [] Collagen with Silver   [] Santyl with Moisten saline gauze     [] Hydrocolloid   [] MediHoney Alginate [] Foam with Silver   [] Foam   [] Hydrofera Blue    [] Mepilex Border    [] Moisten with Saline [] Hydrogel [] Mepitel     [] Bactroban/Mupirocin [] Polysporin  [x] Other:  XEROFORM    [x] Cover and Secure with:     [] Gauze [] Shereen [x] Roll gauze   [] Ace Wrap [] Cover Roll Tape [x] ABD     [] Other:    Avoid contact of tape with skin.     [x] Change dressing: [] Daily    [x] Every Other Day [] Three times per week   [] Once a week [] Do Not Change Dressing   [] Other:    Edema Control:  Apply: [] Compression Stocking []Right Leg []Left Leg   [] Tubigrip []Right Leg Double Layer []Left Leg Double Layer       []Right Leg wait until we are again available, contact your PCP or go to the hospital emergency room. PLEASE NOTE: IF YOU ARE UNABLE TO OBTAIN WOUND SUPPLIES, CONTINUE TO USE THE SUPPLIES YOU HAVE AVAILABLE UNTIL YOU ARE ABLE TO REACH US. IT IS MOST IMPORTANT TO KEEP THE WOUND COVERED AT ALL TIMES.      Physician Signature:_______________________    Date: ___________ Time:  ____________        Eulogio Do CNP                     Electronically signed by SELINA Samuels CNP on 8/4/2022 at 10:37 AM

## 2022-08-10 ENCOUNTER — HOSPITAL ENCOUNTER (OUTPATIENT)
Dept: WOUND CARE | Age: 72
Discharge: HOME | End: 2022-08-10
Payer: MEDICARE

## 2022-08-10 VITALS
DIASTOLIC BLOOD PRESSURE: 72 MMHG | SYSTOLIC BLOOD PRESSURE: 185 MMHG | HEART RATE: 78 BPM | RESPIRATION RATE: 18 BRPM | TEMPERATURE: 97 F

## 2022-08-10 DIAGNOSIS — R60.0 LOCALIZED EDEMA: ICD-10-CM

## 2022-08-10 DIAGNOSIS — M79.89 SWELLING OF LEFT LOWER EXTREMITY: ICD-10-CM

## 2022-08-10 DIAGNOSIS — I87.303 VENOUS HYPERTENSION OF BOTH LOWER EXTREMITIES: ICD-10-CM

## 2022-08-10 DIAGNOSIS — E11.40 CONTROLLED TYPE 2 DIABETES MELLITUS WITH NEUROPATHY (HCC): ICD-10-CM

## 2022-08-10 DIAGNOSIS — R21 GENERALIZED RASH: Primary | ICD-10-CM

## 2022-08-10 PROCEDURE — 99213 OFFICE O/P EST LOW 20 MIN: CPT

## 2022-08-10 PROCEDURE — 99212 OFFICE O/P EST SF 10 MIN: CPT | Performed by: NURSE PRACTITIONER

## 2022-08-10 RX ORDER — LIDOCAINE HYDROCHLORIDE 20 MG/ML
JELLY TOPICAL ONCE
Status: CANCELLED | OUTPATIENT
Start: 2022-08-10 | End: 2022-08-10

## 2022-08-10 RX ORDER — CLOBETASOL PROPIONATE 0.5 MG/G
OINTMENT TOPICAL ONCE
Status: CANCELLED | OUTPATIENT
Start: 2022-08-10 | End: 2022-08-10

## 2022-08-10 RX ORDER — BETAMETHASONE DIPROPIONATE 0.05 %
OINTMENT (GRAM) TOPICAL ONCE
Status: CANCELLED | OUTPATIENT
Start: 2022-08-10 | End: 2022-08-10

## 2022-08-10 RX ORDER — GINSENG 100 MG
CAPSULE ORAL ONCE
Status: CANCELLED | OUTPATIENT
Start: 2022-08-10 | End: 2022-08-10

## 2022-08-10 RX ORDER — LIDOCAINE HYDROCHLORIDE 40 MG/ML
SOLUTION TOPICAL ONCE
Status: CANCELLED | OUTPATIENT
Start: 2022-08-10 | End: 2022-08-10

## 2022-08-10 RX ORDER — BACITRACIN, NEOMYCIN, POLYMYXIN B 400; 3.5; 5 [USP'U]/G; MG/G; [USP'U]/G
OINTMENT TOPICAL ONCE
Status: CANCELLED | OUTPATIENT
Start: 2022-08-10 | End: 2022-08-10

## 2022-08-10 RX ORDER — LIDOCAINE 40 MG/G
CREAM TOPICAL ONCE
Status: CANCELLED | OUTPATIENT
Start: 2022-08-10 | End: 2022-08-10

## 2022-08-10 RX ORDER — GENTAMICIN SULFATE 1 MG/G
OINTMENT TOPICAL ONCE
Status: CANCELLED | OUTPATIENT
Start: 2022-08-10 | End: 2022-08-10

## 2022-08-10 RX ORDER — BACITRACIN ZINC AND POLYMYXIN B SULFATE 500; 1000 [USP'U]/G; [USP'U]/G
OINTMENT TOPICAL ONCE
Status: CANCELLED | OUTPATIENT
Start: 2022-08-10 | End: 2022-08-10

## 2022-08-10 RX ORDER — LIDOCAINE 50 MG/G
OINTMENT TOPICAL ONCE
Status: CANCELLED | OUTPATIENT
Start: 2022-08-10 | End: 2022-08-10

## 2022-08-10 RX ORDER — LIDOCAINE HYDROCHLORIDE 40 MG/ML
SOLUTION TOPICAL ONCE
Status: COMPLETED | OUTPATIENT
Start: 2022-08-10 | End: 2022-08-10

## 2022-08-10 RX ADMIN — LIDOCAINE HYDROCHLORIDE: 40 SOLUTION TOPICAL at 08:42

## 2022-08-10 ASSESSMENT — PAIN DESCRIPTION - LOCATION: LOCATION: LEG

## 2022-08-10 ASSESSMENT — PAIN - FUNCTIONAL ASSESSMENT: PAIN_FUNCTIONAL_ASSESSMENT: ACTIVITIES ARE NOT PREVENTED

## 2022-08-10 ASSESSMENT — PAIN DESCRIPTION - DESCRIPTORS: DESCRIPTORS: ACHING;DISCOMFORT

## 2022-08-10 ASSESSMENT — PAIN DESCRIPTION - FREQUENCY: FREQUENCY: INTERMITTENT

## 2022-08-10 ASSESSMENT — PAIN DESCRIPTION - ONSET: ONSET: ON-GOING

## 2022-08-10 ASSESSMENT — PAIN SCALES - GENERAL
PAINLEVEL_OUTOF10: 0
PAINLEVEL_OUTOF10: 2

## 2022-08-10 ASSESSMENT — PAIN DESCRIPTION - ORIENTATION: ORIENTATION: LEFT

## 2022-08-10 NOTE — DISCHARGE INSTRUCTIONS
[facility] and [facility]   Physician Orders and Discharge Instructions  [address]  [address], [place] [address]  Telephone: [phone]     NAME:  Cassandra Betts OF BIRTH:  1950  MEDICAL RECORD NUMBER:  [id number]  DATE:    8/10/22     Wound Cleansing:  Do not scrub or use excessive force. Cleanse wound prior to applying a clean dressing with:  [] Normal Saline  [x] Keep Wound Dry in Shower    [] Wound Cleanser  [] Cleanse wound with Mild Soap & Water  [] May Shower at Discharge  [] Other:        Topical Treatments:  Do not apply lotions, creams, or ointments to wound bed unless directed. [] Apply moisturizing lotion to skin surrounding the wound prior to dressing change.  [] Apply antifungal ointment to skin surrounding the wound prior to dressing change.  [] Apply thin film of moisture barrier ointment to skin immediately around wound. [] Other:                 Dressings:                Wound Location left lower leg      [x] Apply Primary Dressing:                                            [] MediHoney Gel      [] Alginate with Silver [] Alginate             [] Collagen     [] Collagen with Silver   [] Santyl with Moisten saline gauze               [] Hydrocolloid            [] MediHoney Alginate        [] Foam with Silver             [] Foam   [] Hydrofera Blue            [] Mepilex Border                    [] Moisten with Saline           [] Hydrogel     [] Mepitel                     [] Bactroban/Mupirocin         [] Polysporin              [x] Other:  XEROFORM     [x] Cover and Secure with:                         [] Gauze        [] Shereen           [x] Roll gauze             [] Ace Wrap    [] Cover Roll Tape     [x] ABD                                   [] Other:             Avoid contact of tape with skin.      [x] Change dressing:          [] Daily          [x] Every Other Day [] Three times per week             [] Once a week         [] Do Not Change Dressing     [] Other:     Edema Control:  Apply:  [] Compression Stocking       []Right Leg     []Left Leg             [] Tubigrip      []Right Leg Double Layer      []Left Leg Double Layer                                              []Right Leg Single Layer       []Left Leg Single Layer             [x] SpandaGrip          []Right Leg     [x]Left Leg                                      [x]Low compression 5-10 mm/Hg                                                []Medium compression 10-20 mm/Hg                                   []High compression  20-30 mm/Hg             every morning immediately when getting up should be applied to affected leg(s) from mid foot to knee making sure to cover the heel. Remove every night before going to bed. [] Elevate leg(s) above the level of the heart when sitting. [] Avoid prolonged standing in one place. [] Elevate arm/hand above the level of the heart    []RightArm     []LeftArm            Compression:  Apply:  [] Multilayer Compression Wrap Applied in Clinic     []RightLeg      []Left Leg             [] Multi-layer compression. Do not get leg(s) with wrap wet. If wraps become too tight call the center or completely remove the wrap. [] Elevate leg(s) above the level of the heart when sitting. [] Avoid prolonged standing in one place. Activity:  [x] Activity as tolerated: If you are still having pain after you go home:  [x] Elevate the affected limb. [x] Use over-the-counter medications you would normally use for pain as permitted by your family doctor. [x] For persistent pain not relieved by the above interventions, please call your family doctor.             Return Appointment:  [x] Wound and dressing supply provider:  [facility] [address] [place] [address]  p:[phone] f: [phone]  [] ECF or Home Healthcare:     [x] Return Appointment: With Tram Carr  in  1 Week(s)  [x] Ordered tests: VASCULAR STUDIES (Aug 11 th) AND DVT STUDY (Aug 4 th)          Nurse Case Manger:  SILVINA / Rae Luciano 45 Information: Should you experience any significant changes in your wound(s) or have questions about your wound care, please contact the [address] [place] [address] at [address] 8:00 am - 4:30 pm and Friday 8:00 am - 12:30 pm.  If you need help with your wound outside these hours and cannot wait until we are again available, contact your PCP or go to the hospital emergency room. PLEASE NOTE: IF YOU ARE UNABLE TO OBTAIN WOUND SUPPLIES, CONTINUE TO USE THE SUPPLIES YOU HAVE AVAILABLE UNTIL YOU ARE ABLE TO REACH US. IT IS MOST IMPORTANT TO KEEP THE WOUND COVERED AT ALL TIMES.      Physician Signature:_______________________     Date: ___________ Time:  ____________                              Eulogio Do CNP

## 2022-08-10 NOTE — PLAN OF CARE
[address] [facility]:     [facility] [address] [facility] , [address]  [phone] f: [phone]     [place]:      [address]  [address]  [facility] [place] [address]  [phone]  WOUND CARE Dept: [phone]   FAX NUMBER [unfilled]    Patient Information:      Camacho Brysonvalentinoyamilexvehanane [address] [place] [address]   [phone]   : 1950  AGE: 80 y.o.      GENDER: female   TODAYS DATE:  8/10/2022    Insurance:      PRIMARY INSURANCE:  Plan: MEDICARE PART A AND B  Coverage: MEDICARE  Effective Date: 2017  [id number] - (Medicare)    SECONDARY INSURANCE:  Plan: [facility]  Coverage: [employer]  Effective Date: 2018  Shantanu Mota  [id number]  [unfilled]   [unfilled]     Patient Wound Information:      Problem List Items Addressed This Visit          Circulatory    Venous hypertension of both lower extremities       Endocrine    Controlled type 2 diabetes mellitus with neuropathy ([facility] [address]       Other    Generalized rash - Primary    Relevant Orders    Initiate Outpatient Wound Care Protocol    Swelling of left lower extremity    Relevant Orders    Initiate Outpatient Wound Care Protocol    Localized edema    Relevant Orders    Initiate Outpatient Wound Care Protocol     ICD-10 codes:      R21 Medium  8/3/2022   Swelling of left lower extremity M79.89 Medium  8/3/2022   Localized edema R60.0        Venous hypertension of both lower extremities I87.303         WOUNDS REQUIRING DRESSING SUPPLIES:     Wound 22 Left #1 initial left lower leg (Active)   Wound Image   22 0839   Wound Etiology Venous 22 0852   Dressing Status Old drainage noted 08/10/22 0836   Dressing/Treatment Xeroform;ABD;Roll gauze 08/10/22 0941   Wound Length (cm) 8.5 cm 08/10/22 0836   Wound Width (cm) 22 cm 08/10/22 0836   Wound Depth (cm) 0.1 cm 08/10/22 0836   Wound Surface Area (cm^2) 187 cm^2 08/10/22 0836   Change in Wound Size % (l*w) 30.61 08/10/22 0836   Wound Volume (cm^3) 18.7 cm^3 08/10/22 0836   Wound Healing % 65 08/10/22 0836   Post-Procedure Length (cm) 8.5 cm 08/10/22 0912   Post-Procedure Width (cm) 22 cm 08/10/22 0912   Post-Procedure Depth (cm) 0.1 cm 08/10/22 0912   Post-Procedure Surface Area (cm^2) 187 cm^2 08/10/22 0912   Post-Procedure Volume (cm^3) 18.7 cm^3 08/10/22 0912   Wound Assessment Ruptured blister;Slough 08/10/22 0836   Drainage Amount Moderate 08/10/22 0836   Drainage Description Serous; Yellow 08/10/22 0836   Odor None 08/10/22 0836   Delicia-wound Assessment Blanchable erythema 08/10/22 0836   Margins Undefined edges 08/10/22 0836   Wound Thickness Description not for Pressure Injury Full thickness 08/10/22 0836   Number of days: 7          Supplies Requested :      WOUND #: 1   PRIMARY DRESSING:  Other: XEROFORM   Cover and Secure with: ABD pad  Bulky roll gauze     FREQUENCY OF DRESSING CHANGES:  Every other day           ADDITIONAL ITEMS:   [x] Gloves Large    [x] Tape 2\" [] Tape 3\"    [x] Saline  [] Skin Prep   [] Adhesive Remover   [] Cotton Tip Applicators   [] Other:    Patient Wound(s) Debrided: [x] Yes   [] No    Debribement Type: subcutaneous tissue    Debridement Date: 8/10/22    Patient currently being seen by Home Health: [] Yes   [x] No    Duration for needed supplies:  []15  []30  []60  [x]90 Days    Provider Information:      PROVIDER'S NAME: SELINA Curiel CNP     NPI: LD - NPI  [id number]

## 2022-08-11 ENCOUNTER — PROCEDURE VISIT (OUTPATIENT)
Dept: SURGERY | Age: 72
End: 2022-08-11
Payer: MEDICARE

## 2022-08-11 DIAGNOSIS — M79.89 SWELLING OF LEFT LOWER EXTREMITY: Primary | ICD-10-CM

## 2022-08-11 DIAGNOSIS — R60.0 LOCALIZED EDEMA: ICD-10-CM

## 2022-08-11 NOTE — PROGRESS NOTES
[address]   Progress Note and Procedure Note      Carlton Dye  MEDICAL RECORD NUMBER:  [id number]  AGE: 80 y.o. GENDER: female  : 1950  EPISODE DATE:  8/10/2022    Subjective:     Chief Complaint   Patient presents with    Wound Check     Follow Up on Left Lower Leg         HISTORY of PRESENT ILLNESS HPI   Carlton Dye is a 80 y.o. female who presents today for wound/ulcer evaluation of left leg redness, blistering and generalized pruritic rash. Pt was trying steroid cream to areas of rash without success. Was put on Keflex after rash appeared and has noted decrease in swelling or blisters. Pt reports had injured left lower leg on car door in last few months and since left leg has remained more swollen. Denies residual pain from injury from car door. Pt is very hard of hearing. For the past few days, she has had progressive swelling. She injured her leg a few days ago. she does not report any fever, chills or nausea or vomiting. Over the past 2 days she has developed 2 blisters in the back of her legs. She does have difficulty with chronic lower extremity swelling. History of Wound Context: ~22 pt noted redness, swelling and blister development left lower leg. Before that she noticed a generalized flat red, pruritic rash. Wound/Ulcer Pain Timing/Severity: intermittent, moderate  Quality of pain: tender  Severity:  3 / 10  Modifying Factors:  most pain left lower leg and itching from rash  Associated Signs/Symptoms: edema, erythema, drainage, and pain     Ulcer Identification:  Ulcer Type: venous  Today presents with dried and closing blister left posterior leg, thin blisters anterior leg with less serous fluid. Steroids lessened itching and pt noted a temporary increase in blood sugar but blood sugar now back in normal range for her. Denies other constitutional issues.    PAST MEDICAL HISTORY        Diagnosis Date    Arthritis     Chronic kidney disease (CKD) stage G3a/A3, moderately decreased glomerular filtration rate (GFR) between 45-59 mL/min/1.73 square meter and albuminuria creatinine ratio greater than 300 mg/g (HCC)     Diverticulitis     Essential hypertension 07/01/2011    Generalized rash 8/3/2022    Hyperlipidemia     Localized edema 8/3/2022    Neuropathy     PONV (postoperative nausea and vomiting)     only hysterectomy    Renal cyst     Swelling of left lower extremity 8/3/2022    Type 2 diabetes mellitus without complication ([facility] 67/56/3823    Venous hypertension of both lower extremities 8/10/2022       PAST SURGICAL HISTORY    Past Surgical History:   Procedure Laterality Date    APPENDECTOMY  11/12/2017    [address]    CHOLECYSTECTOMY      COLONOSCOPY      x`s 2-3    COLONOSCOPY N/A 2/12/2020    Dr. Finn Lopez, 6 polyps removed, repeat in 2023    HYSTERECTOMY, TOTAL ABDOMINAL (CERVIX REMOVED)         FAMILY HISTORY    Family History   Problem Relation Age of Onset    Heart Defect Mother     Arrhythmia Mother     Cancer Father        SOCIAL HISTORY    Social History     Tobacco Use    Smoking status: Never    Smokeless tobacco: Never   Vaping Use    Vaping Use: Never used   Substance Use Topics    Alcohol use: No    Drug use: No       ALLERGIES    No Known Allergies    MEDICATIONS    Current Outpatient Medications on File Prior to Encounter   Medication Sig Dispense Refill    triamcinolone (KENALOG) 0.1 % cream Apply topically 2 times daily. 45 g 0    hydrocortisone 2.5 % cream Apply topically 2 times daily. 45 g 0    gabapentin (NEURONTIN) 100 MG capsule TAKE 2 CAPSULES BY MOUTH NIGHTLY FOR 90 DAYS.  180 capsule 0    amLODIPine (NORVASC) 5 MG tablet TAKE 1 TABLET BY MOUTH TWICE A  tablet 3    blood glucose test strips (ONETOUCH VERIO) strip USE AS DIRECTED TWICE DAILY AS NEEDED 200 strip 5    LEVEMIR FLEXTOUCH 100 UNIT/ML injection pen INJECT 50 UNITS SUBCUTANEOUSLY TWICE A DAY 10 pen 3    cloNIDine (CATAPRES) 0.1 MG tablet TAKE 1 TABLET BY MOUTH TWICE A  tablet 2    lisinopril (PRINIVIL;ZESTRIL) 40 MG tablet TAKE 1 TABLET BY MOUTH EVERY DAY 90 tablet 1    simvastatin (ZOCOR) 20 MG tablet TAKE 1 TABLET BY MOUTH EVERY DAY 90 tablet 0    allopurinol (ZYLOPRIM) 100 MG tablet TAKE 1 TABLET BY MOUTH EVERY DAY 90 tablet 1    allopurinol (ZYLOPRIM) 300 MG tablet TAKE 1 TABLET BY MOUTH EVERY DAY 90 tablet 1    spironolactone (ALDACTONE) 25 MG tablet TAKE 1 TABLET BY MOUTH EVERY DAY 90 tablet 3    metFORMIN (GLUCOPHAGE-XR) 500 MG extended release tablet TAKE 2 TABLETS BY MOUTH TWICE A  tablet 3    gemfibrozil (LOPID) 600 MG tablet TAKE 1 TABLET BY MOUTH TWICE A  tablet 1    tiZANidine (ZANAFLEX) 4 MG tablet Take 1 tablet by mouth every 8 hours as needed (back pain) 30 tablet 0    Insulin Pen Needle (BD PEN NEEDLE ELIZABETH 2ND GEN) 32G X 4 MM MISC USE 4 TIMESDAILY 100 each 4    furosemide (LASIX) 20 MG tablet TAKE 1 TABLET BY MOUTH EVERY DAY 90 tablet 1    DULoxetine (CYMBALTA) 20 MG extended release capsule Take 1 capsule by mouth daily 90 capsule 3    ONE TOUCH ULTRASOFT LANCETS MISC 1 each by Does not apply route 2 times daily 200 each 3    Multiple Vitamins-Minerals (CENTRUM SILVER) TABS Take  by mouth daily. aspirin 81 MG chewable tablet Take 81 mg by mouth daily. No current facility-administered medications on file prior to encounter. REVIEW OF SYSTEMS  Review of Systems    Pertinent items are noted in HPI.     Objective:      BP (!) 185/72   Pulse 78   Temp 97 °F (36.1 °C) (Temporal)   Resp 18   LMP  (LMP Unknown)     Wt Readings from Last 3 Encounters:   08/03/22 256 lb 9.9 oz (116.4 kg)   08/03/22 256 lb (116.1 kg)   07/29/22 262 lb 6.4 oz (119 kg)       PHYSICAL EXAM  Physical Exam    General Appearance: alert and oriented to person, place and time, well-developed and well-nourished, in no acute distress  Skin: warm and dry, no rash or erythema and warm and dry  Head: normocephalic and atraumatic  Eyes: pupils equal, round, and reactive to light  Pulmonary/Chest: normal air movement, no respiratory distress  Cardiovascular: normal rate, regular rhythm, and intact distal pulses  Extremities: no cyanosis and no clubbing      Assessment:        Problem List Items Addressed This Visit       Generalized rash - Primary    Swelling of left lower extremity    Localized edema    Venous hypertension of both lower extremities    Controlled type 2 diabetes mellitus with neuropathy ([facility]        Procedure Note  Indications:  Based on my examination of this patient's wound(s)/ulcer(s) today, debridement is not required to promote healing and evaluate the wound base. Wound/Ulcer Descriptions are Pre Debridement except measurements:    Wound 08/03/22 Left #1 initial left lower leg (Active)   Wound Image   08/03/22 0839   Wound Etiology Venous 08/03/22 0852   Dressing Status Old drainage noted 08/10/22 0836   Dressing/Treatment Xeroform;ABD;Roll gauze 08/10/22 0941   Wound Length (cm) 8.5 cm 08/10/22 0836   Wound Width (cm) 22 cm 08/10/22 0836   Wound Depth (cm) 0.1 cm 08/10/22 0836   Wound Surface Area (cm^2) 187 cm^2 08/10/22 0836   Change in Wound Size % (l*w) 30.61 08/10/22 0836   Wound Volume (cm^3) 18.7 cm^3 08/10/22 0836   Wound Healing % 65 08/10/22 0836   Post-Procedure Length (cm) 8.5 cm 08/10/22 0912   Post-Procedure Width (cm) 22 cm 08/10/22 0912   Post-Procedure Depth (cm) 0.1 cm 08/10/22 0912   Post-Procedure Surface Area (cm^2) 187 cm^2 08/10/22 0912   Post-Procedure Volume (cm^3) 18.7 cm^3 08/10/22 0912   Wound Assessment Ruptured blister;Slough 08/10/22 0836   Drainage Amount Moderate 08/10/22 0836   Drainage Description Serous; Yellow 08/10/22 0836   Odor None 08/10/22 0836   Delicia-wound Assessment Blanchable erythema 08/10/22 0836   Margins Undefined edges 08/10/22 0836   Wound Thickness Description not for Pressure Injury Full thickness 08/10/22 0836   Number of days: 8        Plan:     Treatment Note please see attached Discharge Instructions    Written patient dismissal instructions given to patient and signed by patient or POA. Discharge [address] and Hyperbaric Oxygen Therapy   Physician Orders and Discharge Instructions  [place] [address]  Telephone: [phone]     NAME:  Gopi Fiore OF BIRTH:  1950  MEDICAL RECORD NUMBER:  [id number]  DATE:    8/10/22     Wound Cleansing:  Do not scrub or use excessive force. Cleanse wound prior to applying a clean dressing with:  [] Normal Saline  [x] Keep Wound Dry in Shower    [] Wound Cleanser  [] Cleanse wound with Mild Soap & Water  [] May Shower at Discharge  [] Other:        Topical Treatments:  Do not apply lotions, creams, or ointments to wound bed unless directed. [] Apply moisturizing lotion to skin surrounding the wound prior to dressing change.  [] Apply antifungal ointment to skin surrounding the wound prior to dressing change.  [] Apply thin film of moisture barrier ointment to skin immediately around wound.   [] Other:                 Dressings:                Wound Location left lower leg      [x] Apply Primary Dressing:                                            [] MediHoney Gel      [] Alginate with Silver [] Alginate             [] Collagen     [] Collagen with Silver   [] Santyl with Moisten saline gauze               [] Hydrocolloid            [] MediHoney Alginate        [] Foam with Silver             [] Foam   [] Hydrofera Blue            [] Mepilex Border                    [] Moisten with Saline           [] Hydrogel     [] Mepitel                     [] Bactroban/Mupirocin         [] Polysporin              [x] Other:  XEROFORM     [x] Cover and Secure with:                         [] Gauze        [] Shereen           [x] Roll gauze             [] Ace Wrap    [] Cover Roll Tape     [x] ABD [] Other:             Avoid contact of tape with skin. [x] Change dressing:          [] Daily          [x] Every Other Day [] Three times per week             [] Once a week         [] Do Not Change Dressing     [] Other:     Edema Control:  Apply:  [] Compression Stocking       []Right Leg     []Left Leg             [] Tubigrip      []Right Leg Double Layer      []Left Leg Double Layer                                              []Right Leg Single Layer       []Left Leg Single Layer             [x] SpandaGrip          []Right Leg     [x]Left Leg                                      [x]Low compression 5-10 mm/Hg                                                []Medium compression 10-20 mm/Hg                                   []High compression  20-30 mm/Hg             every morning immediately when getting up should be applied to affected leg(s) from mid foot to knee making sure to cover the heel. Remove every night before going to bed. [] Elevate leg(s) above the level of the heart when sitting. [] Avoid prolonged standing in one place. [] Elevate arm/hand above the level of the heart    []RightArm     []LeftArm            Compression:  Apply:  [] Multilayer Compression Wrap Applied in Clinic     []RightLeg      []Left Leg             [] Multi-layer compression. Do not get leg(s) with wrap wet. If wraps become too tight call the center or completely remove the wrap. [] Elevate leg(s) above the level of the heart when sitting. [] Avoid prolonged standing in one place. Activity:  [x] Activity as tolerated: If you are still having pain after you go home:  [x] Elevate the affected limb. [x] Use over-the-counter medications you would normally use for pain as permitted by your family doctor. [x] For persistent pain not relieved by the above interventions, please call your family doctor. Return Appointment:  [x] Wound and dressing supply provider:  [facility] [address] [place] [address] Selena De La Rosa  p:[phone] f: [phone]  [] ECF or Home Healthcare:     [x] Return Appointment: With Elvin Rodriguez  in  1 Week(s)  [x] Ordered tests: VASCULAR STUDIES (Aug 11 th) AND DVT STUDY (Aug 4 th)          Nurse Case Manger:  SILVINA / Rae Luciano [address] Information: Should you experience any significant changes in your wound(s) or have questions about your wound care, please contact the [address] [place] [address] at [address] 8:00 am - 4:30 pm and Friday 8:00 am - 12:30 pm.  If you need help with your wound outside these hours and cannot wait until we are again available, contact your PCP or go to the hospital emergency room. PLEASE NOTE: IF YOU ARE UNABLE TO OBTAIN WOUND SUPPLIES, CONTINUE TO USE THE SUPPLIES YOU HAVE AVAILABLE UNTIL YOU ARE ABLE TO REACH US. IT IS MOST IMPORTANT TO KEEP THE WOUND COVERED AT ALL TIMES.      Physician Signature:_______________________     Date: ___________ Time:  ____________                              Mary Bellamy CNP                             Electronically signed by SELINA Chowdhury CNP on 8/11/2022 at 10:40 AM

## 2022-08-12 PROCEDURE — 93971 EXTREMITY STUDY: CPT | Performed by: SURGERY

## 2022-08-17 ENCOUNTER — HOSPITAL ENCOUNTER (OUTPATIENT)
Dept: WOUND CARE | Age: 72
Discharge: HOME | End: 2022-08-17
Payer: MEDICARE

## 2022-08-17 VITALS
HEART RATE: 66 BPM | RESPIRATION RATE: 18 BRPM | SYSTOLIC BLOOD PRESSURE: 147 MMHG | TEMPERATURE: 97.7 F | DIASTOLIC BLOOD PRESSURE: 77 MMHG

## 2022-08-17 DIAGNOSIS — R60.0 LOCALIZED EDEMA: ICD-10-CM

## 2022-08-17 DIAGNOSIS — R21 GENERALIZED RASH: Primary | ICD-10-CM

## 2022-08-17 DIAGNOSIS — M79.89 SWELLING OF LEFT LOWER EXTREMITY: ICD-10-CM

## 2022-08-17 DIAGNOSIS — I87.303 VENOUS HYPERTENSION OF BOTH LOWER EXTREMITIES: ICD-10-CM

## 2022-08-17 DIAGNOSIS — E66.01 CLASS 3 SEVERE OBESITY DUE TO EXCESS CALORIES WITH SERIOUS COMORBIDITY AND BODY MASS INDEX (BMI) OF 40.0 TO 44.9 IN ADULT (HCC): ICD-10-CM

## 2022-08-17 PROCEDURE — 11042 DBRDMT SUBQ TIS 1ST 20SQCM/<: CPT | Performed by: NURSE PRACTITIONER

## 2022-08-17 PROCEDURE — 11042 DBRDMT SUBQ TIS 1ST 20SQCM/<: CPT

## 2022-08-17 RX ORDER — LIDOCAINE HYDROCHLORIDE 40 MG/ML
SOLUTION TOPICAL ONCE
Status: CANCELLED | OUTPATIENT
Start: 2022-08-17 | End: 2022-08-17

## 2022-08-17 RX ORDER — BACITRACIN, NEOMYCIN, POLYMYXIN B 400; 3.5; 5 [USP'U]/G; MG/G; [USP'U]/G
OINTMENT TOPICAL ONCE
Status: CANCELLED | OUTPATIENT
Start: 2022-08-17 | End: 2022-08-17

## 2022-08-17 RX ORDER — CLOBETASOL PROPIONATE 0.5 MG/G
OINTMENT TOPICAL ONCE
Status: CANCELLED | OUTPATIENT
Start: 2022-08-17 | End: 2022-08-17

## 2022-08-17 RX ORDER — BETAMETHASONE DIPROPIONATE 0.05 %
OINTMENT (GRAM) TOPICAL ONCE
Status: CANCELLED | OUTPATIENT
Start: 2022-08-17 | End: 2022-08-17

## 2022-08-17 RX ORDER — LIDOCAINE 40 MG/G
CREAM TOPICAL ONCE
Status: CANCELLED | OUTPATIENT
Start: 2022-08-17 | End: 2022-08-17

## 2022-08-17 RX ORDER — GENTAMICIN SULFATE 1 MG/G
OINTMENT TOPICAL ONCE
Status: CANCELLED | OUTPATIENT
Start: 2022-08-17 | End: 2022-08-17

## 2022-08-17 RX ORDER — LIDOCAINE HYDROCHLORIDE 40 MG/ML
SOLUTION TOPICAL ONCE
Status: COMPLETED | OUTPATIENT
Start: 2022-08-17 | End: 2022-08-17

## 2022-08-17 RX ORDER — LIDOCAINE HYDROCHLORIDE 20 MG/ML
JELLY TOPICAL ONCE
Status: CANCELLED | OUTPATIENT
Start: 2022-08-17 | End: 2022-08-17

## 2022-08-17 RX ORDER — BACITRACIN ZINC AND POLYMYXIN B SULFATE 500; 1000 [USP'U]/G; [USP'U]/G
OINTMENT TOPICAL ONCE
Status: CANCELLED | OUTPATIENT
Start: 2022-08-17 | End: 2022-08-17

## 2022-08-17 RX ORDER — GINSENG 100 MG
CAPSULE ORAL ONCE
Status: CANCELLED | OUTPATIENT
Start: 2022-08-17 | End: 2022-08-17

## 2022-08-17 RX ORDER — LIDOCAINE 50 MG/G
OINTMENT TOPICAL ONCE
Status: CANCELLED | OUTPATIENT
Start: 2022-08-17 | End: 2022-08-17

## 2022-08-17 RX ADMIN — LIDOCAINE HYDROCHLORIDE: 40 SOLUTION TOPICAL at 08:43

## 2022-08-17 ASSESSMENT — PAIN SCALES - GENERAL: PAINLEVEL_OUTOF10: 0

## 2022-08-17 NOTE — PROGRESS NOTES
1    allopurinol (ZYLOPRIM) 300 MG tablet TAKE 1 TABLET BY MOUTH EVERY DAY 90 tablet 1    spironolactone (ALDACTONE) 25 MG tablet TAKE 1 TABLET BY MOUTH EVERY DAY 90 tablet 3    metFORMIN (GLUCOPHAGE-XR) 500 MG extended release tablet TAKE 2 TABLETS BY MOUTH TWICE A  tablet 3    gemfibrozil (LOPID) 600 MG tablet TAKE 1 TABLET BY MOUTH TWICE A  tablet 1    tiZANidine (ZANAFLEX) 4 MG tablet Take 1 tablet by mouth every 8 hours as needed (back pain) 30 tablet 0    Insulin Pen Needle (BD PEN NEEDLE ELIZABETH 2ND GEN) 32G X 4 MM MISC USE 4 TIMESDAILY 100 each 4    furosemide (LASIX) 20 MG tablet TAKE 1 TABLET BY MOUTH EVERY DAY 90 tablet 1    DULoxetine (CYMBALTA) 20 MG extended release capsule Take 1 capsule by mouth daily 90 capsule 3    ONE TOUCH ULTRASOFT LANCETS MISC 1 each by Does not apply route 2 times daily 200 each 3    Multiple Vitamins-Minerals (CENTRUM SILVER) TABS Take  by mouth daily. aspirin 81 MG chewable tablet Take 81 mg by mouth daily. No current facility-administered medications on file prior to encounter. REVIEW OF SYSTEMS  Review of Systems    Pertinent items are noted in HPI.     Objective:      BP (!) 147/77   Pulse 66   Temp 97.7 °F (36.5 °C) (Infrared)   Resp 18   LMP  (LMP Unknown)     Wt Readings from Last 3 Encounters:   08/03/22 256 lb 9.9 oz (116.4 kg)   08/03/22 256 lb (116.1 kg)   07/29/22 262 lb 6.4 oz (119 kg)       PHYSICAL EXAM  Physical Exam    General Appearance: alert and oriented to person, place and time and obese  Skin: warm and dry  Head: normocephalic and atraumatic  Eyes: pupils equal, round, and reactive to light  Pulmonary/Chest: normal air movement, no respiratory distress  Cardiovascular: normal rate and regular rhythm  Extremities: no cyanosis, no clubbing, and 1 + edema-  right lower leg      Assessment:        Problem List Items Addressed This Visit       Generalized rash - Primary    Relevant Orders    Initiate Outpatient Wound Care Protocol    Swelling of left lower extremity    Relevant Orders    Initiate Outpatient Wound Care Protocol    Localized edema    Relevant Orders    Initiate Outpatient Wound Care Protocol    Venous hypertension of both lower extremities    Relevant Orders    Initiate Outpatient Wound Care Protocol    Class 3 severe obesity due to excess calories with serious comorbidity and body mass index (BMI) of 40.0 to 44.9 in adult [facility])        Procedure Note  Indications:  Based on my examination of this patient's wound(s)/ulcer(s) today, debridement is required to promote healing and evaluate the wound base. Performed by: SELINA High CNP    Consent obtained:  Yes    Time out taken:  Yes    Pain Control: Anesthetic  Anesthetic: 4% Lidocaine Liquid Topical       Debridement: Excisional Debridement    Using curette the wound(s)/ulcer(s) was/were debrided down through and including the removal of epidermis, dermis, and subcutaneous tissue.         Devitalized Tissue Debrided:  fibrin, biofilm, and slough    Pre Debridement Measurements:  Are located in the [place]  Documentation Flow Sheet    Diabetic/Pressure/Non Pressure Ulcers only:  Ulcer: Non-Pressure ulcer, fat layer exposed     Wound/Ulcer #: 1    Post Debridement Measurements:  Wound/Ulcer Descriptions are Pre Debridement except measurements:    Wound 08/03/22 Left #1 initial left lower leg (Active)   Wound Image   08/03/22 0839   Wound Etiology Venous 08/03/22 0852   Dressing Status Old drainage noted 08/17/22 0837   Dressing/Treatment Xeroform;Dry dressing;Roll gauze 08/17/22 0900   Wound Length (cm) 1.3 cm 08/17/22 0837   Wound Width (cm) 1 cm 08/17/22 0837   Wound Depth (cm) 0.1 cm 08/17/22 0837   Wound Surface Area (cm^2) 1.3 cm^2 08/17/22 0837   Change in Wound Size % (l*w) 99.52 08/17/22 0837   Wound Volume (cm^3) 0.13 cm^3 08/17/22 0837   Wound Healing % 100 08/17/22 0837   Post-Procedure Length (cm) 1.4 cm 08/17/22 0849   Post-Procedure Width (cm) 1.1 cm 08/17/22 0849   Post-Procedure Depth (cm) 0.2 cm 08/17/22 0849   Post-Procedure Surface Area (cm^2) 1.54 cm^2 08/17/22 0849   Post-Procedure Volume (cm^3) 0.308 cm^3 08/17/22 0849   Wound Assessment Granulation tissue;Slough 08/17/22 0837   Drainage Amount Small 08/17/22 0837   Drainage Description Serosanguinous 08/17/22 0837   Odor None 08/17/22 0837   Delicia-wound Assessment Blanchable erythema 08/17/22 0837   Margins Attached edges 08/17/22 0837   Wound Thickness Description not for Pressure Injury Full thickness 08/17/22 0837   Number of days: 14          Total Surface Area Debrided:  1.54 sq cm     Estimated Blood Loss:  Minimal    Hemostasis Achieved:  by pressure    Procedural Pain:  0  / 10     Post Procedural Pain:  0 / 10     Response to treatment:  Well tolerated by patient. Plan:   Results of venous studies reviewed with pt and pt to call for an evaluation to vascular to see if procedure would be of help with her chronic swelling left leg. Treatment Note please see attached Discharge Instructions    Written patient dismissal instructions given to patient and signed by patient or POA. Discharge [place] [address] and Hyperbaric Oxygen Therapy   Physician Orders and Discharge Instructions  [address]  [address], [facility]  Telephone: [phone]     NAME:  González Ramos OF BIRTH:  1950  MEDICAL RECORD NUMBER:  [id number]  DATE:    8/17/22     Wound Cleansing:  Do not scrub or use excessive force. Cleanse wound prior to applying a clean dressing with:  [] Normal Saline  [x] Keep Wound Dry in Shower    [] Wound Cleanser  [] Cleanse wound with Mild Soap & Water  [] May Shower at Discharge  [] Other:        Topical Treatments:  Do not apply lotions, creams, or ointments to wound bed unless directed.   [x] Apply moisturizing lotion to skin surrounding the wound prior to dressing change.  [] Apply antifungal ointment to skin surrounding the wound prior to dressing change.  [] Apply thin film of moisture barrier ointment to skin immediately around wound. [] Other:                 Dressings:                Wound Location left lower leg      [x] Apply Primary Dressing:                                            [] MediHoney Gel      [] Alginate with Silver [] Alginate             [] Collagen     [] Collagen with Silver   [] Santyl with Moisten saline gauze               [] Hydrocolloid            [] MediHoney Alginate        [] Foam with Silver             [] Foam   [] Hydrofera Blue            [] Mepilex Border                    [] Moisten with Saline           [] Hydrogel     [] Mepitel                     [] Bactroban/Mupirocin         [] Polysporin              [x] Other:  XEROFORM to wound area only     [x] Cover and Secure with:                         [x] Gauze        [] Shereen           [x] Roll gauze             [] Ace Wrap    [] Cover Roll Tape     [] ABD                                   [] Other:             Avoid contact of tape with skin.      [x] Change dressing:          [] Daily          [x] Every Other Day [] Three times per week             [] Once a week         [] Do Not Change Dressing     [] Other:     Edema Control:  Apply:  [] Compression Stocking       []Right Leg     []Left Leg             [] Tubigrip      []Right Leg Double Layer      []Left Leg Double Layer                                              []Right Leg Single Layer       []Left Leg Single Layer             [x] SpandaGrip          []Right Leg     [x]Left Leg                                      [x]Low compression 5-10 mm/Hg                                                []Medium compression 10-20 mm/Hg                                   []High compression  20-30 mm/Hg             every morning immediately when getting up should be applied to affected leg(s) from mid foot to knee

## 2022-08-17 NOTE — DISCHARGE INSTRUCTIONS
[facility] Wound Care and Hyperbaric Oxygen Therapy   Physician Orders and Discharge Instructions  [facility]  [address]  [address], [place]  Telephone: [phone]     NAME:  González Ramos OF BIRTH:  1950  MEDICAL RECORD NUMBER:  [id number]  DATE:    8/17/22     Wound Cleansing:  Do not scrub or use excessive force. Cleanse wound prior to applying a clean dressing with:  [] Normal Saline  [x] Keep Wound Dry in Shower    [] Wound Cleanser  [] Cleanse wound with Mild Soap & Water  [] May Shower at Discharge  [] Other:        Topical Treatments:  Do not apply lotions, creams, or ointments to wound bed unless directed. [x] Apply moisturizing lotion to skin surrounding the wound prior to dressing change.  [] Apply antifungal ointment to skin surrounding the wound prior to dressing change.  [] Apply thin film of moisture barrier ointment to skin immediately around wound. [] Other:                 Dressings:                Wound Location left lower leg      [x] Apply Primary Dressing:                                            [] MediHoney Gel      [] Alginate with Silver [] Alginate             [] Collagen     [] Collagen with Silver   [] Santyl with Moisten saline gauze               [] Hydrocolloid            [] MediHoney Alginate        [] Foam with Silver             [] Foam   [] Hydrofera Blue            [] Mepilex Border                    [] Moisten with Saline           [] Hydrogel     [] Mepitel                     [] Bactroban/Mupirocin         [] Polysporin              [x] Other:  XEROFORM to wound area only     [x] Cover and Secure with:                         [x] Gauze        [] Shereen           [x] Roll gauze             [] Ace Wrap    [] Cover Roll Tape     [] ABD                                   [] Other:             Avoid contact of tape with skin.      [x] Change dressing:          [] Daily [x] Every Other Day [] Three times per week             [] Once a week         [] Do Not Change Dressing     [] Other:     Edema Control:  Apply:  [] Compression Stocking       []Right Leg     []Left Leg             [] Tubigrip      []Right Leg Double Layer      []Left Leg Double Layer                                              []Right Leg Single Layer       []Left Leg Single Layer             [x] SpandaGrip          []Right Leg     [x]Left Leg                                      [x]Low compression 5-10 mm/Hg                                                []Medium compression 10-20 mm/Hg                                   []High compression  20-30 mm/Hg             every morning immediately when getting up should be applied to affected leg(s) from mid foot to knee making sure to cover the heel. Remove every night before going to bed. [] Elevate leg(s) above the level of the heart when sitting. [] Avoid prolonged standing in one place. [] Elevate arm/hand above the level of the heart    []RightArm     []LeftArm            Compression:  Apply:  [] Multilayer Compression Wrap Applied in Clinic     []RightLeg      []Left Leg             [] Multi-layer compression. Do not get leg(s) with wrap wet. If wraps become too tight call the center or completely remove the wrap. [] Elevate leg(s) above the level of the heart when sitting. [] Avoid prolonged standing in one place. Activity:  [x] Activity as tolerated: If you are still having pain after you go home:  [x] Elevate the affected limb. [x] Use over-the-counter medications you would normally use for pain as permitted by your family doctor. [x] For persistent pain not relieved by the above interventions, please call your family doctor.             Return Appointment:  [x] Wound and dressing supply provider:  [employer] [address] [place] [address]  [place] p:[phone] f: [phone]  [] ECF or Home Healthcare:     [x] Return Appointment: With Inocente Dakins  in  1 Week(s)  [] Ordered tests: VASCULAR STUDIES (Aug 11 th) AND DVT STUDY (Aug 4 th)  completed and reviewed    Contact Dr. Cristofer Elizabeth or Dr. Harsh Clemens regarding Vascular Study results ([phone])          Nurse Case Manger:  Tisha Winters / [facility] Tavera         Electronically signed by Lo Wilson RN on 8/17/2022 at 8:53 AM       Ellie Calvo 281: Should you experience any significant changes in your wound(s) or have questions about your wound care, please contact the [address] [place] [address] at [address] 8:00 am - 4:30 pm and Friday 8:00 am - 12:30 pm.  If you need help with your wound outside these hours and cannot wait until we are again available, contact your PCP or go to the hospital emergency room. PLEASE NOTE: IF YOU ARE UNABLE TO OBTAIN WOUND SUPPLIES, CONTINUE TO USE THE SUPPLIES YOU HAVE AVAILABLE UNTIL YOU ARE ABLE TO REACH US. IT IS MOST IMPORTANT TO KEEP THE WOUND COVERED AT ALL TIMES.      Physician Signature:_______________________     Date: ___________ Time:  ____________                              Laurel Sanchez CNP

## 2022-08-18 ENCOUNTER — OFFICE VISIT (OUTPATIENT)
Dept: ENDOCRINOLOGY | Age: 72
End: 2022-08-18
Payer: MEDICARE

## 2022-08-18 VITALS
WEIGHT: 255 LBS | BODY MASS INDEX: 42.49 KG/M2 | SYSTOLIC BLOOD PRESSURE: 168 MMHG | OXYGEN SATURATION: 92 % | DIASTOLIC BLOOD PRESSURE: 62 MMHG | HEART RATE: 83 BPM | HEIGHT: 65 IN

## 2022-08-18 DIAGNOSIS — Z79.4 CONTROLLED TYPE 2 DIABETES MELLITUS WITH COMPLICATION, WITH LONG-TERM CURRENT USE OF INSULIN (HCC): Primary | ICD-10-CM

## 2022-08-18 DIAGNOSIS — E11.8 CONTROLLED TYPE 2 DIABETES MELLITUS WITH COMPLICATION, WITH LONG-TERM CURRENT USE OF INSULIN (HCC): Primary | ICD-10-CM

## 2022-08-18 LAB — HBA1C MFR BLD: 7.3 %

## 2022-08-18 PROCEDURE — 1036F TOBACCO NON-USER: CPT | Performed by: INTERNAL MEDICINE

## 2022-08-18 PROCEDURE — 3044F HG A1C LEVEL LT 7.0%: CPT | Performed by: INTERNAL MEDICINE

## 2022-08-18 PROCEDURE — 1090F PRES/ABSN URINE INCON ASSESS: CPT | Performed by: INTERNAL MEDICINE

## 2022-08-18 PROCEDURE — G8417 CALC BMI ABV UP PARAM F/U: HCPCS | Performed by: INTERNAL MEDICINE

## 2022-08-18 PROCEDURE — 2022F DILAT RTA XM EVC RTNOPTHY: CPT | Performed by: INTERNAL MEDICINE

## 2022-08-18 PROCEDURE — G8427 DOCREV CUR MEDS BY ELIG CLIN: HCPCS | Performed by: INTERNAL MEDICINE

## 2022-08-18 PROCEDURE — 83036 HEMOGLOBIN GLYCOSYLATED A1C: CPT | Performed by: INTERNAL MEDICINE

## 2022-08-18 PROCEDURE — G8399 PT W/DXA RESULTS DOCUMENT: HCPCS | Performed by: INTERNAL MEDICINE

## 2022-08-18 PROCEDURE — 3017F COLORECTAL CA SCREEN DOC REV: CPT | Performed by: INTERNAL MEDICINE

## 2022-08-18 PROCEDURE — 1124F ACP DISCUSS-NO DSCNMKR DOCD: CPT | Performed by: INTERNAL MEDICINE

## 2022-08-18 PROCEDURE — 99214 OFFICE O/P EST MOD 30 MIN: CPT | Performed by: INTERNAL MEDICINE

## 2022-08-18 NOTE — PROGRESS NOTES
Seen as f/u patient for diabetes       Interim;   Glucose stable  Left leg wound  Not taking metformin    MRI 3/22 showed right 2.4cm adrenal adenoma, stable  Left 1.3cm seen on previous CT  2017 CT no adrenal nodule  No hormonal testing    She had hypercalcemia noted on labs  10/20 Ca 10.0 PTH 56  2/21 Ca 10.7     She is off MVT now    Diagnosed with Type 2 diabetes mellitus > 5   years  Known diabetic complications: none     Current diabetic medications   Levemir 46/46 units BID  Metformin ER 1gm BID- not taking    Moderate, controlled, no worsening factors    Intolerance to diabetes medications: yes - Metformin -bad taste in mouth    Last A1c 7.3%,-----6.7%<-----6.8%<-----6.9%<---- 6.5%<-----6.7%<------7%<-----7.2%<------- 6.3%<------5.5%<------- 8.1 on 4/17<------6.9 on 7/16<------- 6.9 on 4/16<-----5.9 on 1/16<----- 6.4 on 10/15<------ 6.9 on 6/15<-----5.9 on 2/15<----7.4 on 10/13<---- 8.8 on 5/13<---9.2<---9.6    Prior visit with dietician: No  Current diet: on average, 3 meals per day trying to watch  Current exercise: walking   Current monitoring regimen: home blood tests - 1-2 times daily     Has brought blood glucose log/meter: yes  Home blood sugar records:  Any episodes of hypoglycemia 58    No Hx of CAD , PVD, CVA    Hyperlipidemia:for years, controlled  simvastatin 20mg  LDL 74 on 3/14  Gemfibrozil 600 BID  LDL 87 on 1/20  Last eye exam: 5/21  Last foot exam:12/21    She had some microalbuminuria  Last microalbumin to creatinine ratio: 53.8 on 6/14---> 9/14 nl, 2/16 nl---> 34.9 on 9/17---> 139 on 12/18---> 139 on 12/19---> 154.2 on 2/21    HTN: For years, taking medication, stable  norvasc 5 BID  lisinopril 40mg aldactone 25 clonidine 0.1 BID     Past Medical History:   Diagnosis Date    Arthritis     Chronic kidney disease (CKD) stage G3a/A3, moderately decreased glomerular filtration rate (GFR) between 45-59 mL/min/1.73 square meter and albuminuria creatinine ratio greater than 300 mg/g (HCC) Diverticulitis     Essential hypertension 07/01/2011    Generalized rash 8/3/2022    Hyperlipidemia     Localized edema 8/3/2022    Neuropathy     PONV (postoperative nausea and vomiting)     only hysterectomy    Renal cyst     Swelling of left lower extremity 8/3/2022    Type 2 diabetes mellitus without complication ([facility].) 59/45/8847    Venous hypertension of both lower extremities 8/10/2022     Past Surgical History:   Procedure Laterality Date    APPENDECTOMY  11/12/2017    [address]    CHOLECYSTECTOMY      COLONOSCOPY      x`s 2-3    COLONOSCOPY N/A 2/12/2020    Dr. Venancio Baker, 6 polyps removed, repeat in 2023    HYSTERECTOMY, TOTAL ABDOMINAL (CERVIX REMOVED)       Current Outpatient Medications   Medication Sig Dispense Refill    triamcinolone (KENALOG) 0.1 % cream Apply topically 2 times daily. 45 g 0    hydrocortisone 2.5 % cream Apply topically 2 times daily.  45 g 0    amLODIPine (NORVASC) 5 MG tablet TAKE 1 TABLET BY MOUTH TWICE A  tablet 3    blood glucose test strips (ONETOUCH VERIO) strip USE AS DIRECTED TWICE DAILY AS NEEDED 200 strip 5    LEVEMIR FLEXTOUCH 100 UNIT/ML injection pen INJECT 50 UNITS SUBCUTANEOUSLY TWICE A DAY 10 pen 3    cloNIDine (CATAPRES) 0.1 MG tablet TAKE 1 TABLET BY MOUTH TWICE A  tablet 2    lisinopril (PRINIVIL;ZESTRIL) 40 MG tablet TAKE 1 TABLET BY MOUTH EVERY DAY 90 tablet 1    simvastatin (ZOCOR) 20 MG tablet TAKE 1 TABLET BY MOUTH EVERY DAY 90 tablet 0    allopurinol (ZYLOPRIM) 100 MG tablet TAKE 1 TABLET BY MOUTH EVERY DAY 90 tablet 1    allopurinol (ZYLOPRIM) 300 MG tablet TAKE 1 TABLET BY MOUTH EVERY DAY 90 tablet 1    spironolactone (ALDACTONE) 25 MG tablet TAKE 1 TABLET BY MOUTH EVERY DAY 90 tablet 3    metFORMIN (GLUCOPHAGE-XR) 500 MG extended release tablet TAKE 2 TABLETS BY MOUTH TWICE A  tablet 3    gemfibrozil (LOPID) 600 MG tablet TAKE 1 TABLET BY MOUTH TWICE A  tablet 1    tiZANidine (ZANAFLEX) 4 MG tablet Take 1 tablet by mouth every 8 hours as needed (back pain) 30 tablet 0    Insulin Pen Needle (BD PEN NEEDLE ELIZABETH 2ND GEN) 32G X 4 MM MISC USE 4 TIMESDAILY 100 each 4    furosemide (LASIX) 20 MG tablet TAKE 1 TABLET BY MOUTH EVERY DAY 90 tablet 1    ONE TOUCH ULTRASOFT LANCETS MISC 1 each by Does not apply route 2 times daily 200 each 3    Multiple Vitamins-Minerals (CENTRUM SILVER) TABS Take  by mouth daily. aspirin 81 MG chewable tablet Take 81 mg by mouth daily. gabapentin (NEURONTIN) 100 MG capsule TAKE 2 CAPSULES BY MOUTH NIGHTLY FOR 90 DAYS. 180 capsule 0    DULoxetine (CYMBALTA) 20 MG extended release capsule Take 1 capsule by mouth daily 90 capsule 3     No current facility-administered medications for this visit. Review of Systems    Scanned, reviewed    Vitals:    08/18/22 1417   BP: (!) 168/62   Pulse: 83   SpO2: 92%       Constitutional: Well-developed, appears stated age, cooperative, in no acute distress  H/E/N/M/T:atraumatic, normocephalic, external ears, nose, lips normal without lesions  Eyes: Lids, lashes, conjunctivae and sclerae normal, No proptosis, no redness  Neck: supple, symmetrical, no swelling  Skin: No obvious rashes or lesions present.   Skin and hair texture normal  Psychiatric: Judgement and Insight:  judgement and insight appear normal  Neuro: Normal without focal findings, speech is normal normal, speech is spontaneous  Chest: No labored breathing, no chest deformity, no stridor  Musculoskeletal: No joint deformity, swelling      12/21  Skeletal foot exam is normal, no skin lesions, toenails are normal,10 g monofilament is decreased    Lab Reviewed   No components found for: CHLPL  Lab Results   Component Value Date    TRIG 124 12/22/2021    TRIG 147 01/09/2020    TRIG 130 10/02/2013     Lab Results   Component Value Date    HDL 49 12/22/2021    HDL 45 01/09/2020    HDL 41 10/02/2013     Lab Results   Component Value Date    LDLCALC 92 12/22/2021    LDLCALC 87 01/09/2020    [address] 10/02/2013     Lab Results   Component Value Date    LABVLDL 25 12/22/2021    LABVLDL 29 01/09/2020    LABVLDL 26 10/02/2013     Lab Results   Component Value Date    LABA1C 6.7 04/14/2022       Assessment:     Parth Omalley is a 80 y.o. female with :    1.T2DM:Longstanding, well controlled, insulin resistant. She was eating more CHO,  advised low CHo diet, continue levemir,may add GLP-1 agonist . A1c at goal-   She is not taking metformin, will stop  2. HTN: BP slightly elevated  3. HLD:last LDL at goal  4. Obesity  5. Microalbuminuria: recheck elevated, on lisinopril  6. Neuropathy: states has pain at night,  started low dose neurontin but had side effect, discussed lyrica/amitryptiline, would like to wait  She was started back on it by PCP, working now  7. Hypercalcemia: Mild, PTH not completely suppressed, may have mild primary hyperparathyroidism. Nl  vitamin D. 8.Adrenal nodule: Will do hormonal testing, did not have done yet    Plan:      levemir 46/46   switch to NPH if levemir too costly for patient   Advise to check blood sugar 2-3 times a day   Patient to send blood sugar log for titration. Advise to exercise regularly but can not due to hip pain,Advise to low simple carbohydrate and protein with each  meal diet. 30gm with meal   Diabetes Care: recommend yearly eye exam, foot exam and urine microalbumin to   creatinine ratio.  Patient is up-to-date.       -Hyperlipidemia, LDL goal is <100 mg/dl   -Hypertension:Continue same  -Daily ASA:Takes  -Smoking status:Non smoker    4 months

## 2022-08-24 ENCOUNTER — HOSPITAL ENCOUNTER (OUTPATIENT)
Dept: WOUND CARE | Age: 72
Discharge: HOME | End: 2022-08-24
Payer: MEDICARE

## 2022-08-24 VITALS
RESPIRATION RATE: 18 BRPM | SYSTOLIC BLOOD PRESSURE: 136 MMHG | DIASTOLIC BLOOD PRESSURE: 71 MMHG | TEMPERATURE: 98.1 F | HEART RATE: 63 BPM

## 2022-08-24 DIAGNOSIS — R21 GENERALIZED RASH: Primary | ICD-10-CM

## 2022-08-24 DIAGNOSIS — I87.303 VENOUS HYPERTENSION OF BOTH LOWER EXTREMITIES: ICD-10-CM

## 2022-08-24 DIAGNOSIS — R60.0 LOCALIZED EDEMA: ICD-10-CM

## 2022-08-24 DIAGNOSIS — M79.89 SWELLING OF LEFT LOWER EXTREMITY: ICD-10-CM

## 2022-08-24 PROCEDURE — 99212 OFFICE O/P EST SF 10 MIN: CPT

## 2022-08-24 PROCEDURE — 99212 OFFICE O/P EST SF 10 MIN: CPT | Performed by: NURSE PRACTITIONER

## 2022-08-24 NOTE — PROGRESS NOTES
rash 8/3/2022    Hyperlipidemia     Localized edema 8/3/2022    Neuropathy     PONV (postoperative nausea and vomiting)     only hysterectomy    Renal cyst     Swelling of left lower extremity 8/3/2022    Type 2 diabetes mellitus without complication ([facility].) 74/06/4600    Venous hypertension of both lower extremities 8/10/2022       PAST SURGICAL HISTORY    Past Surgical History:   Procedure Laterality Date    APPENDECTOMY  11/12/2017    [facility]    CHOLECYSTECTOMY      COLONOSCOPY      x`s 2-3    COLONOSCOPY N/A 2/12/2020    Dr. Fisher Last, 6 polyps removed, repeat in 2023    HYSTERECTOMY, TOTAL ABDOMINAL (CERVIX REMOVED)         FAMILY HISTORY    Family History   Problem Relation Age of Onset    Heart Defect Mother     Arrhythmia Mother     Cancer Father        SOCIAL HISTORY    Social History     Tobacco Use    Smoking status: Never    Smokeless tobacco: Never   Vaping Use    Vaping Use: Never used   Substance Use Topics    Alcohol use: No    Drug use: No       ALLERGIES    No Known Allergies    MEDICATIONS    Current Outpatient Medications on File Prior to Encounter   Medication Sig Dispense Refill    triamcinolone (KENALOG) 0.1 % cream Apply topically 2 times daily. 45 g 0    hydrocortisone 2.5 % cream Apply topically 2 times daily. 45 g 0    gabapentin (NEURONTIN) 100 MG capsule TAKE 2 CAPSULES BY MOUTH NIGHTLY FOR 90 DAYS.  180 capsule 0    amLODIPine (NORVASC) 5 MG tablet TAKE 1 TABLET BY MOUTH TWICE A  tablet 3    blood glucose test strips (ONETOUCH VERIO) strip USE AS DIRECTED TWICE DAILY AS NEEDED 200 strip 5    LEVEMIR FLEXTOUCH 100 UNIT/ML injection pen INJECT 50 UNITS SUBCUTANEOUSLY TWICE A DAY 10 pen 3    cloNIDine (CATAPRES) 0.1 MG tablet TAKE 1 TABLET BY MOUTH TWICE A  tablet 2    lisinopril (PRINIVIL;ZESTRIL) 40 MG tablet TAKE 1 TABLET BY MOUTH EVERY DAY 90 tablet 1    simvastatin (ZOCOR) 20 MG tablet TAKE 1 TABLET BY MOUTH EVERY DAY 90 tablet 0    allopurinol (ZYLOPRIM) 100 MG tablet TAKE 1 TABLET BY MOUTH EVERY DAY 90 tablet 1    allopurinol (ZYLOPRIM) 300 MG tablet TAKE 1 TABLET BY MOUTH EVERY DAY 90 tablet 1    spironolactone (ALDACTONE) 25 MG tablet TAKE 1 TABLET BY MOUTH EVERY DAY 90 tablet 3    gemfibrozil (LOPID) 600 MG tablet TAKE 1 TABLET BY MOUTH TWICE A  tablet 1    tiZANidine (ZANAFLEX) 4 MG tablet Take 1 tablet by mouth every 8 hours as needed (back pain) 30 tablet 0    Insulin Pen Needle (BD PEN NEEDLE ELIZABETH 2ND GEN) 32G X 4 MM MISC USE 4 TIMESDAILY 100 each 4    furosemide (LASIX) 20 MG tablet TAKE 1 TABLET BY MOUTH EVERY DAY 90 tablet 1    DULoxetine (CYMBALTA) 20 MG extended release capsule Take 1 capsule by mouth daily 90 capsule 3    ONE TOUCH ULTRASOFT LANCETS MISC 1 each by Does not apply route 2 times daily 200 each 3    Multiple Vitamins-Minerals (CENTRUM SILVER) TABS Take  by mouth daily. aspirin 81 MG chewable tablet Take 81 mg by mouth daily. No current facility-administered medications on file prior to encounter. REVIEW OF SYSTEMS  Review of Systems    Pertinent items are noted in HPI.     Objective:      /71   Pulse 63   Temp 98.1 °F (36.7 °C) (Temporal)   Resp 18   LMP  (LMP Unknown)     Wt Readings from Last 3 Encounters:   08/18/22 255 lb (115.7 kg)   08/03/22 256 lb 9.9 oz (116.4 kg)   08/03/22 256 lb (116.1 kg)       PHYSICAL EXAM  Physical Exam    General Appearance: alert and oriented to person, place and time, well-developed and well-nourished, in no acute distress, obese, and pale  Skin: warm and dry, no rash or erythema  Head: normocephalic and atraumatic  Eyes: pupils equal, round, and reactive to light  Pulmonary/Chest: clear to auscultation bilaterally- no wheezes, rales or rhonchi, normal air movement, no respiratory distress  Cardiovascular: normal rate, regular rhythm, and intact distal pulses  Extremities: no cyanosis, no clubbing, and 1 + edema-  bilateral lower legs      Assessment:        Problem List Items Addressed This Visit       Generalized rash - Primary    Swelling of left lower extremity    Localized edema    Venous hypertension of both lower extremities        Procedure Note  Indications:  Based on my examination of this patient's wound(s)/ulcer(s) today, debridement is not required to promote healing and evaluate the wound base. Wound/Ulcer Descriptions are Pre Debridement except measurements:    Wound 08/03/22 Left #1 initial left lower leg (Active)   Wound Image   08/24/22 0844   Wound Etiology Venous 08/03/22 0852   Dressing Status Intact;Dry;Clean 08/24/22 0844   Dressing/Treatment Xeroform;Dry dressing;Roll gauze 08/17/22 0900   Wound Length (cm) 0 cm 08/24/22 0844   Wound Width (cm) 0 cm 08/24/22 0844   Wound Depth (cm) 0 cm 08/24/22 0844   Wound Surface Area (cm^2) 0 cm^2 08/24/22 0844   Change in Wound Size % (l*w) 100 08/24/22 0844   Wound Volume (cm^3) 0 cm^3 08/24/22 0844   Wound Healing % 100 08/24/22 0844   Post-Procedure Length (cm) 0 cm 08/24/22 0854   Post-Procedure Width (cm) 0 cm 08/24/22 0854   Post-Procedure Depth (cm) 0 cm 08/24/22 0854   Post-Procedure Surface Area (cm^2) 0 cm^2 08/24/22 0854   Post-Procedure Volume (cm^3) 0 cm^3 08/24/22 0854   Wound Assessment Epithelialization 08/24/22 0844   Drainage Amount Small 08/17/22 0837   Drainage Description Serosanguinous 08/17/22 0837   Odor None 08/17/22 0837   Delicia-wound Assessment Blanchable erythema 08/17/22 0837   Margins Attached edges 08/17/22 0837   Wound Thickness Description not for Pressure Injury Full thickness 08/17/22 0837   Number of days: 21     Plan:   Pt education per provider related to preventative care and compression stockings ordered with Rx to be filled. Questions answered. Treatment Note please see attached Discharge Instructions    Written patient dismissal instructions given to patient and signed by patient or POA.          Discharge Instructions                        [address] Physician Orders [facility] [place]  [address] [address]  Telephone: [phone]    NAME:  Valery Martin OF BIRTH:  1950  MEDICAL RECORD NUMBER:  [id number]  DATE:  8/24/2022    Congratulations! You have completed your treatment. MEDIUM SPANDAGRIP TO BILATERAL LEGS  - MEDIUM COMPRESSION  PLEASE CONTACT [facility] FOR MEASUREMENT FOR COMPRESSION STOCKINGS    Return to your Primary Care Physician for all your health issues. Resume your ordinary activities as tolerated. Take your medications as prescribed by your primary care physician. Check your skin daily for cracks, bruises, sores, or dryness. Use a moisturizer as needed. Clean and dry your skin, using mild soap and warm water (not hot). Avoid alcohol and caffeine and do not smoke. Maintain a nutritious diet. Avoid pressure on your wound site. Keep your legs elevated above the level of the heart whenever possible. Continue to use wraps/stockings/compression as prescribed. Replace compression stockings every four to six months as needed to ensure proper fit. Wear well-fitting shoes and leg garments.       Physician Signature:______________________    Date: ___________ Time:  ____________    Musa Chun CNP            Electronically signed by Miguel Ángel Ellis RN on 8/24/2022 at 8:55 AM                           Electronically signed by SELINA Baldwin CNP on 8/24/2022 at 10:04 AM

## 2022-08-24 NOTE — DISCHARGE INSTRUCTIONS
[address]  Physician Orders   [facility] [place]  [address] [facility] [address], [place] [address]  Telephone: [phone]    NAME:  Maikel Tubbs OF BIRTH:  1950  MEDICAL RECORD NUMBER:  [id number]  DATE:  8/24/2022    Congratulations! You have completed your treatment. MEDIUM SPANDAGRIP TO BILATERAL LEGS  - MEDIUM COMPRESSION  PLEASE CONTACT [facility] FOR MEASUREMENT FOR COMPRESSION STOCKINGS    Return to your Primary Care Physician for all your health issues. Resume your ordinary activities as tolerated. Take your medications as prescribed by your primary care physician. Check your skin daily for cracks, bruises, sores, or dryness. Use a moisturizer as needed. Clean and dry your skin, using mild soap and warm water (not hot). Avoid alcohol and caffeine and do not smoke. Maintain a nutritious diet. Avoid pressure on your wound site. Keep your legs elevated above the level of the heart whenever possible. Continue to use wraps/stockings/compression as prescribed. Replace compression stockings every four to six months as needed to ensure proper fit. Wear well-fitting shoes and leg garments.       Physician Signature:______________________    Date: ___________ Time:  ____________    Sandra Oliveira CNP            Electronically signed by Alvin Gil RN on 8/24/2022 at 8:55 AM

## 2022-08-27 DIAGNOSIS — Z79.4 CONTROLLED TYPE 2 DIABETES MELLITUS WITH COMPLICATION, WITH LONG-TERM CURRENT USE OF INSULIN (HCC): ICD-10-CM

## 2022-08-27 DIAGNOSIS — E11.8 CONTROLLED TYPE 2 DIABETES MELLITUS WITH COMPLICATION, WITH LONG-TERM CURRENT USE OF INSULIN (HCC): ICD-10-CM

## 2022-08-29 RX ORDER — INSULIN DETEMIR 100 [IU]/ML
INJECTION, SOLUTION SUBCUTANEOUS
Qty: 10 PEN | Refills: 3 | Status: SHIPPED | OUTPATIENT
Start: 2022-08-29

## 2022-08-29 NOTE — TELEPHONE ENCOUNTER
Medication:   Requested Prescriptions     Pending Prescriptions Disp Refills    LEVEMIR FLEXTOUCH 100 UNIT/ML injection pen [Pharmacy Med Name: Jazmin Arndt 100 UNIT/ML] 10 pen 3     Sig: INJECT 50 UNITS SUBCUTANEOUSLY TWICE A DAY       Last Filled:      Patient Phone Number: [phone] (home)     Last appt: 4/9/2021   Next appt: 12/15/2022    Last Labs DM:   Lab Results   Component Value Date/Time    LABA1C 7.3 08/18/2022 05:14 PM

## 2022-09-16 ENCOUNTER — INITIAL CONSULT (OUTPATIENT)
Dept: VASCULAR SURGERY | Age: 72
End: 2022-09-16
Payer: MEDICARE

## 2022-09-16 VITALS — SYSTOLIC BLOOD PRESSURE: 124 MMHG | WEIGHT: 255 LBS | BODY MASS INDEX: 42.43 KG/M2 | DIASTOLIC BLOOD PRESSURE: 80 MMHG

## 2022-09-16 DIAGNOSIS — N18.31 STAGE 3A CHRONIC KIDNEY DISEASE (HCC): ICD-10-CM

## 2022-09-16 DIAGNOSIS — I10 ESSENTIAL HYPERTENSION: Chronic | ICD-10-CM

## 2022-09-16 DIAGNOSIS — E11.42 DIABETIC POLYNEUROPATHY ASSOCIATED WITH TYPE 2 DIABETES MELLITUS (HCC): ICD-10-CM

## 2022-09-16 DIAGNOSIS — E78.2 MIXED HYPERLIPIDEMIA: ICD-10-CM

## 2022-09-16 DIAGNOSIS — M79.89 SWELLING OF LEFT LOWER EXTREMITY: ICD-10-CM

## 2022-09-16 DIAGNOSIS — Z79.4 LONG TERM (CURRENT) USE OF INSULIN (HCC): ICD-10-CM

## 2022-09-16 DIAGNOSIS — R60.0 LOCALIZED EDEMA: ICD-10-CM

## 2022-09-16 DIAGNOSIS — M79.89 LEG SWELLING: Primary | ICD-10-CM

## 2022-09-16 DIAGNOSIS — E11.40 CONTROLLED TYPE 2 DIABETES MELLITUS WITH NEUROPATHY (HCC): ICD-10-CM

## 2022-09-16 DIAGNOSIS — I87.303 VENOUS HYPERTENSION OF BOTH LOWER EXTREMITIES: ICD-10-CM

## 2022-09-16 PROCEDURE — 1090F PRES/ABSN URINE INCON ASSESS: CPT | Performed by: SURGERY

## 2022-09-16 PROCEDURE — 1036F TOBACCO NON-USER: CPT | Performed by: SURGERY

## 2022-09-16 PROCEDURE — G8399 PT W/DXA RESULTS DOCUMENT: HCPCS | Performed by: SURGERY

## 2022-09-16 PROCEDURE — G8417 CALC BMI ABV UP PARAM F/U: HCPCS | Performed by: SURGERY

## 2022-09-16 PROCEDURE — 3051F HG A1C>EQUAL 7.0%<8.0%: CPT | Performed by: SURGERY

## 2022-09-16 PROCEDURE — 3017F COLORECTAL CA SCREEN DOC REV: CPT | Performed by: SURGERY

## 2022-09-16 PROCEDURE — 1124F ACP DISCUSS-NO DSCNMKR DOCD: CPT | Performed by: SURGERY

## 2022-09-16 PROCEDURE — G8427 DOCREV CUR MEDS BY ELIG CLIN: HCPCS | Performed by: SURGERY

## 2022-09-16 PROCEDURE — 99204 OFFICE O/P NEW MOD 45 MIN: CPT | Performed by: SURGERY

## 2022-09-16 PROCEDURE — 2022F DILAT RTA XM EVC RTNOPTHY: CPT | Performed by: SURGERY

## 2022-09-16 ASSESSMENT — ENCOUNTER SYMPTOMS
GASTROINTESTINAL NEGATIVE: 1
ALLERGIC/IMMUNOLOGIC NEGATIVE: 1

## 2022-09-16 NOTE — PROGRESS NOTES
Daily Progress Note   Nelson Whyte MD      9/16/2022    Chief Complaint   Patient presents with    New Patient     Ref by wound center for leg swelling and healing of wound on left leg. Pt had a reflux study done roughly 6 weeks ago, burning sensation in that leg, swelling has gone down, she does wear compression stockings daily which are helping. HISTORY OF PRESENT ILLNESS:                The patient is a 80 y.o. female who presents with a referral from Bria Bah CNP, for leg swelling and reflux. Mrs. Oanh Álvarez had wound ulcers on her left leg that would not heal. She says it was a blister larger than a lemon, turning into an ulcer. It has since healed after going to the [address]. She wears 30-40 mmHg compression stockings daily. Mrs. Oanh Álvarez also says she sleeps in a recliner, with her legs up, but no above her heart. She does say her legs look better in the morning. She has diabetes, hypertension, and hyperlipidema, all treated and followed by Dr. Yu Cintron.      Past Medical History:   Diagnosis Date    Arthritis     Chronic kidney disease (CKD) stage G3a/A3, moderately decreased glomerular filtration rate (GFR) between 45-59 mL/min/1.73 square meter and albuminuria creatinine ratio greater than 300 mg/g (HCC)     Diverticulitis     Essential hypertension 07/01/2011    Generalized rash 8/3/2022    Hyperlipidemia     Localized edema 8/3/2022    Neuropathy     PONV (postoperative nausea and vomiting)     only hysterectomy    Renal cyst     Swelling of left lower extremity 8/3/2022    Type 2 diabetes mellitus without complication ([facility] [address] 46/72/1889    Venous hypertension of both lower extremities 8/10/2022       Past Surgical History:   Procedure Laterality Date    APPENDECTOMY  11/12/2017    [address]    CHOLECYSTECTOMY      COLONOSCOPY      x`s 2-3    COLONOSCOPY N/A 2/12/2020    Dr. Andrew Solis, 6 polyps removed, repeat in 2023    HYSTERECTOMY, TOTAL ABDOMINAL (CERVIX REMOVED)         Social History Socioeconomic History    Marital status:      Spouse name: Not on file    Number of children: Not on file    Years of education: Not on file    Highest education level: Not on file   Occupational History    Not on file   Tobacco Use    Smoking status: Never    Smokeless tobacco: Never   Vaping Use    Vaping Use: Never used   Substance and Sexual Activity    Alcohol use: No    Drug use: No    Sexual activity: Not Currently   Other Topics Concern    Not on file   Social History Narrative    Not on file     Social Determinants of Health     Financial Resource Strain: Unknown    Difficulty of Paying Living Expenses: Patient refused   Food Insecurity: Unknown    Worried About Running Out of Food in the Last Year: Patient refused    Ran Out of Food in the Last Year: Patient refused   Transportation Needs: Not on file   Physical Activity: Not on file   Stress: Not on file   Social Connections: Not on file   Intimate Partner Violence: Not on file   Housing Stability: Not on file       Family History   Problem Relation Age of Onset    Heart Defect Mother     Arrhythmia Mother     Cancer Father          Current Outpatient Medications:     simvastatin (ZOCOR) 20 MG tablet, TAKE 1 TABLET BY MOUTH EVERY DAY, Disp: 90 tablet, Rfl: 0    furosemide (LASIX) 20 MG tablet, TAKE 1 TABLET BY MOUTH EVERY DAY, Disp: 90 tablet, Rfl: 1    LEVEMIR FLEXTOUCH 100 UNIT/ML injection pen, INJECT 50 UNITS SUBCUTANEOUSLY TWICE A DAY, Disp: 10 pen, Rfl: 3    triamcinolone (KENALOG) 0.1 % cream, Apply topically 2 times daily. , Disp: 45 g, Rfl: 0    hydrocortisone 2.5 % cream, Apply topically 2 times daily. , Disp: 45 g, Rfl: 0    amLODIPine (NORVASC) 5 MG tablet, TAKE 1 TABLET BY MOUTH TWICE A DAY, Disp: 180 tablet, Rfl: 3    blood glucose test strips (ONETOUCH VERIO) strip, USE AS DIRECTED TWICE DAILY AS NEEDED, Disp: 200 strip, Rfl: 5    cloNIDine (CATAPRES) 0.1 MG tablet, TAKE 1 TABLET BY MOUTH TWICE A DAY, Disp: 180 tablet, Rfl: 2 lisinopril (PRINIVIL;ZESTRIL) 40 MG tablet, TAKE 1 TABLET BY MOUTH EVERY DAY, Disp: 90 tablet, Rfl: 1    allopurinol (ZYLOPRIM) 100 MG tablet, TAKE 1 TABLET BY MOUTH EVERY DAY, Disp: 90 tablet, Rfl: 1    allopurinol (ZYLOPRIM) 300 MG tablet, TAKE 1 TABLET BY MOUTH EVERY DAY, Disp: 90 tablet, Rfl: 1    spironolactone (ALDACTONE) 25 MG tablet, TAKE 1 TABLET BY MOUTH EVERY DAY, Disp: 90 tablet, Rfl: 3    gemfibrozil (LOPID) 600 MG tablet, TAKE 1 TABLET BY MOUTH TWICE A DAY, Disp: 180 tablet, Rfl: 1    tiZANidine (ZANAFLEX) 4 MG tablet, Take 1 tablet by mouth every 8 hours as needed (back pain), Disp: 30 tablet, Rfl: 0    Insulin Pen Needle (BD PEN NEEDLE ELIZABETH 2ND GEN) 32G X 4 MM MISC, USE 4 TIMESDAILY, Disp: 100 each, Rfl: 4    ONE TOUCH ULTRASOFT LANCETS MISC, 1 each by Does not apply route 2 times daily, Disp: 200 each, Rfl: 3    Multiple Vitamins-Minerals (CENTRUM SILVER) TABS, Take  by mouth daily. , Disp: , Rfl:     aspirin 81 MG chewable tablet, Take 81 mg by mouth daily. , Disp: , Rfl:     gabapentin (NEURONTIN) 100 MG capsule, TAKE 2 CAPSULES BY MOUTH NIGHTLY FOR 90 DAYS., Disp: 180 capsule, Rfl: 0    DULoxetine (CYMBALTA) 20 MG extended release capsule, Take 1 capsule by mouth daily, Disp: 90 capsule, Rfl: 3    Patient has no known allergies. Vitals:    09/16/22 1009   BP: 124/80   Weight: 255 lb (115.7 kg)       Office Visit on 08/18/2022   Component Date Value Ref Range Status    Hemoglobin A1C 08/18/2022 7.3  % Final       Review of Systems   Constitutional: Negative. HENT: Negative. Eyes:  Positive for visual disturbance (wears glasses). Cardiovascular:  Positive for leg swelling. Gastrointestinal: Negative. Endocrine: Negative. Genitourinary: Negative. Musculoskeletal: Negative. Skin: Negative. Allergic/Immunologic: Negative. Neurological: Negative. Hematological: Negative. Psychiatric/Behavioral: Negative. All other systems reviewed and are negative.     Physical Exam  Vitals and nursing note reviewed. Constitutional:       Appearance: Normal appearance. She is well-developed. HENT:      Mouth/Throat:      Pharynx: No oropharyngeal exudate. Eyes:      Conjunctiva/sclera: Conjunctivae normal.      Pupils: Pupils are equal, round, and reactive to light. Cardiovascular:      Rate and Rhythm: Normal rate and regular rhythm. Pulses:           Carotid pulses are 2+ on the right side and 2+ on the left side. Radial pulses are 2+ on the right side and 2+ on the left side. Femoral pulses are 2+ on the right side and 2+ on the left side. Popliteal pulses are 0 on the right side. Dorsalis pedis pulses are 2+ on the right side and 2+ on the left side. Posterior tibial pulses are 0 on the right side and 0 on the left side. Heart sounds: Normal heart sounds. Comments:   Doppler 9/16/2022:  Rt DP: biphasic  Rt PT: monophasic (weak)  Rt AT:     Lt DP: biphasic (lateral)  Lt PT: biphasic  Lt AT:    MEASUREMENTS 9/16/2022    RIGHT ANKLE: 26.4 cm   RIGHT CALF: 42.8 cm     LEFT ANKLE: 26.5 cm   LEFT CALF: 44.5 cm       Pulmonary:      Effort: Pulmonary effort is normal.      Breath sounds: Normal breath sounds. Abdominal:      General: Bowel sounds are normal.   Musculoskeletal:         General: Normal range of motion. Cervical back: Normal range of motion. Neurological:      Mental Status: She is alert and oriented to person, place, and time. Deep Tendon Reflexes: Reflexes are normal and symmetric. Psychiatric:         Speech: Speech normal.         Behavior: Behavior normal.         Thought Content: Thought content normal.         Judgment: Judgment normal.     She has had her gallbladder and appendix removed. Mrs. Delgado says her parents both had varicose veins. She has had one child.      ASSESSMENT:    Problem List Items Addressed This Visit          Medium    Venous hypertension of both lower extremities    Swelling of left lower extremity    Localized edema       Unprioritized    Essential hypertension (Chronic)    Stage 3 chronic kidney disease (HCC)    Mixed hyperlipidemia    Long term (current) use of insulin (HCC)    Diabetic neuropathy ([address]     Other Visit Diagnoses       Leg swelling    -  Primary        I explained to her with only 1 visible varicose vein in the upper medial calf, and 1  vein identified on the ultrasound as incompetent and the greater saphenous vein only in the calf is incompetent; I do not think an operation would make a big enough change and would relieve her from wearing stockings. I think she is probably going to need to wear stockings at least on the left side for life recommended increasing from the current 15-22 at least a 20-30 to help control the edema. She does not seem that interested in a surgical intervention either. She does have some mild PAD I told her 3 out of 4 over pedal vessels are biphasic and normal.  Only the right posterior tibial is weak there should not give her any symptoms  As far as her hypercholesterolemia she could continue to take her Zocor  Her neuropathy I think is more manifested sensitivity than real numbness she was very ticklish to light touch  She should continue taking her gabapentin and controlling her sugars  PLAN:    Continue to wear your stockings daily, removing them at night when your feet are elevated. Schedule to return in six months for leg swelling. Anita Juarez MA am scribing for and in the presence of Adiel Pardo MD on this date of 09/16/22    I Chance Azul MD personally performed the services described in this documentation as scribed by the Medical Assistant Torres Sumner in my presence and it is both accurate and complete.       Electronically signed by Adiel Pardo MD on 9/16/2022 at 12:56 PM

## 2022-09-16 NOTE — PATIENT INSTRUCTIONS
Continue to wear your stockings daily, removing them at night when your feet are elevated. Schedule to return in six months for leg swelling.  A new prescription for 20-30 mmHg stockings is being given to you

## 2022-10-03 PROBLEM — F33.1 MAJOR DEPRESSIVE DISORDER, RECURRENT, MODERATE (HCC): Status: ACTIVE | Noted: 2022-10-03

## 2022-10-03 PROBLEM — F33.9 MAJOR DEPRESSIVE DISORDER, RECURRENT, UNSPECIFIED (HCC): Status: ACTIVE | Noted: 2022-10-03

## 2022-10-03 PROBLEM — F33.0 MAJOR DEPRESSIVE DISORDER, RECURRENT, MILD (HCC): Status: ACTIVE | Noted: 2022-10-03

## 2022-12-15 ENCOUNTER — OFFICE VISIT (OUTPATIENT)
Dept: ENDOCRINOLOGY | Age: 72
End: 2022-12-15

## 2022-12-15 VITALS
WEIGHT: 252 LBS | RESPIRATION RATE: 14 BRPM | TEMPERATURE: 98 F | HEART RATE: 70 BPM | SYSTOLIC BLOOD PRESSURE: 128 MMHG | BODY MASS INDEX: 41.99 KG/M2 | DIASTOLIC BLOOD PRESSURE: 62 MMHG | HEIGHT: 65 IN

## 2022-12-15 DIAGNOSIS — Z79.4 CONTROLLED TYPE 2 DIABETES MELLITUS WITH COMPLICATION, WITH LONG-TERM CURRENT USE OF INSULIN (HCC): ICD-10-CM

## 2022-12-15 DIAGNOSIS — E11.8 CONTROLLED TYPE 2 DIABETES MELLITUS WITH COMPLICATION, WITH LONG-TERM CURRENT USE OF INSULIN (HCC): ICD-10-CM

## 2022-12-15 LAB — HBA1C MFR BLD: 6.7 %

## 2022-12-15 RX ORDER — ATORVASTATIN CALCIUM 40 MG/1
40 TABLET, FILM COATED ORAL NIGHTLY
COMMUNITY
Start: 2022-11-21

## 2022-12-15 RX ORDER — INSULIN DETEMIR 100 [IU]/ML
INJECTION, SOLUTION SUBCUTANEOUS
Qty: 10 ADJUSTABLE DOSE PRE-FILLED PEN SYRINGE | Refills: 3 | Status: SHIPPED | OUTPATIENT
Start: 2022-12-15

## 2022-12-15 RX ORDER — HYDROXYZINE HYDROCHLORIDE 25 MG/1
TABLET, FILM COATED ORAL
COMMUNITY
Start: 2022-11-28

## 2022-12-15 RX ORDER — METFORMIN HYDROCHLORIDE 500 MG/1
TABLET, EXTENDED RELEASE ORAL
COMMUNITY
Start: 2022-11-23 | End: 2022-12-15

## 2022-12-15 RX ORDER — ESCITALOPRAM OXALATE 10 MG/1
TABLET ORAL
COMMUNITY
Start: 2022-11-16

## 2022-12-15 RX ORDER — LORAZEPAM 0.5 MG/1
TABLET ORAL
COMMUNITY
Start: 2022-11-21

## 2022-12-15 NOTE — PROGRESS NOTES
Seen as f/u patient for diabetes       Interim;   Glucose stable  No issues    MRI 3/22 showed right 2.4cm adrenal adenoma, stable  Left 1.3cm seen on previous CT  2017 CT no adrenal nodule  No hormonal testing, were ordered    She had hypercalcemia noted on labs  10/20 Ca 10.0 PTH 56  2/21 Ca 10.7     She is off MVT now    Diagnosed with Type 2 diabetes mellitus > 5   years  Known diabetic complications: none     Current diabetic medications   Levemir 46/46 units BID  Metformin ER 1gm BID- not taking    Moderate, controlled, no worsening factors    Intolerance to diabetes medications: yes - Metformin -bad taste in mouth    Last A1c 6.7%<-----7.3%,-----6.7%<-----6.8%<-----6.9%<---- 6.5%<-----6.7%<------7%<-----7.2%<------- 6.3%<------5.5%<------- 8.1 on 4/17<------6.9 on 7/16<------- 6.9 on 4/16<-----5.9 on 1/16<----- 6.4 on 10/15<------ 6.9 on 6/15<-----5.9 on 2/15<----7.4 on 10/13<---- 8.8 on 5/13<---9.2<---9.6    Prior visit with dietician: No  Current diet: on average, 3 meals per day trying to watch  Current exercise: walking   Current monitoring regimen: home blood tests - 1-2 times daily     Has brought blood glucose log/meter: yes  Home blood sugar records:  Any episodes of hypoglycemia 58    No Hx of CAD , PVD, CVA    Hyperlipidemia:for years, controlled  simvastatin 20mg  LDL 74 on 3/14  Gemfibrozil 600 BID  LDL 87 on 1/20  Last eye exam: 5/21 due  Last foot exam:12/21    She had some microalbuminuria  Last microalbumin to creatinine ratio: 53.8 on 6/14---> 9/14 nl, 2/16 nl---> 34.9 on 9/17---> 139 on 12/18---> 139 on 12/19---> 154.2 on 2/21    HTN: For years, taking medication, stable  norvasc 5 BID  lisinopril 40mg aldactone 25 clonidine 0.1 BID     Past Medical History:   Diagnosis Date    Arthritis     Chronic kidney disease (CKD) stage G3a/A3, moderately decreased glomerular filtration rate (GFR) between 45-59 mL/min/1.73 square meter and albuminuria creatinine ratio greater than 300 mg/g (HCC) Diverticulitis     Essential hypertension 07/01/2011    Generalized rash 8/3/2022    Hyperlipidemia     Localized edema 8/3/2022    Neuropathy     PONV (postoperative nausea and vomiting)     only hysterectomy    Renal cyst     Swelling of left lower extremity 8/3/2022    Type 2 diabetes mellitus without complication ([facility] [address] 42/35/6408    Venous hypertension of both lower extremities 8/10/2022     Past Surgical History:   Procedure Laterality Date    APPENDECTOMY  11/12/2017    [facility]    CHOLECYSTECTOMY      COLONOSCOPY      x`s 2-3    COLONOSCOPY N/A 2/12/2020    Dr. Richard Castillo, 6 polyps removed, repeat in 2023    HYSTERECTOMY, TOTAL ABDOMINAL (CERVIX REMOVED)       Current Outpatient Medications   Medication Sig Dispense Refill    allopurinol (ZYLOPRIM) 100 MG tablet TAKE 1 TABLET BY MOUTH EVERY DAY 90 tablet 1    simvastatin (ZOCOR) 20 MG tablet TAKE 1 TABLET BY MOUTH EVERY DAY 90 tablet 0    gemfibrozil (LOPID) 600 MG tablet TAKE 1 TABLET BY MOUTH TWICE A  tablet 1    traZODone (DESYREL) 50 MG tablet Take 1 tablet by mouth nightly 90 tablet 0    furosemide (LASIX) 20 MG tablet TAKE 1 TABLET BY MOUTH EVERY DAY 90 tablet 1    LEVEMIR FLEXTOUCH 100 UNIT/ML injection pen INJECT 50 UNITS SUBCUTANEOUSLY TWICE A DAY 10 pen 3    triamcinolone (KENALOG) 0.1 % cream Apply topically 2 times daily. 45 g 0    hydrocortisone 2.5 % cream Apply topically 2 times daily.  45 g 0    amLODIPine (NORVASC) 5 MG tablet TAKE 1 TABLET BY MOUTH TWICE A  tablet 3    blood glucose test strips (ONETOUCH VERIO) strip USE AS DIRECTED TWICE DAILY AS NEEDED 200 strip 5    cloNIDine (CATAPRES) 0.1 MG tablet TAKE 1 TABLET BY MOUTH TWICE A  tablet 2    lisinopril (PRINIVIL;ZESTRIL) 40 MG tablet TAKE 1 TABLET BY MOUTH EVERY DAY 90 tablet 1    allopurinol (ZYLOPRIM) 300 MG tablet TAKE 1 TABLET BY MOUTH EVERY DAY 90 tablet 1    spironolactone (ALDACTONE) 25 MG tablet TAKE 1 TABLET BY MOUTH EVERY DAY 90 tablet 3    tiZANidine (ZANAFLEX) 4 MG tablet Take 1 tablet by mouth every 8 hours as needed (back pain) 30 tablet 0    Insulin Pen Needle (BD PEN NEEDLE ELIZABETH 2ND GEN) 32G X 4 MM MISC USE 4 TIMESDAILY 100 each 4    ONE TOUCH ULTRASOFT LANCETS MISC 1 each by Does not apply route 2 times daily 200 each 3    Multiple Vitamins-Minerals (CENTRUM SILVER) TABS Take  by mouth daily. aspirin 81 MG chewable tablet Take 81 mg by mouth daily. No current facility-administered medications for this visit. Review of Systems    Scanned, reviewed    Vitals:    12/15/22 1518   BP: 128/62   Pulse: 70   Resp: 14   Temp: 98 °F (36.7 °C)       Constitutional: Well-developed, appears stated age, cooperative, in no acute distress  H/E/N/M/T:atraumatic, normocephalic, external ears, nose, lips normal without lesions  Eyes: Lids, lashes, conjunctivae and sclerae normal, No proptosis, no redness  Neck: supple, symmetrical, no swelling  Skin: No obvious rashes or lesions present.   Skin and hair texture normal  Psychiatric: Judgement and Insight:  judgement and insight appear normal  Neuro: Normal without focal findings, speech is normal normal, speech is spontaneous  Chest: No labored breathing, no chest deformity, no stridor  Musculoskeletal: No joint deformity, swelling      12/21  Skeletal foot exam is normal, no skin lesions, toenails are normal,10 g monofilament is decreased    Lab Reviewed   No components found for: CHLPL  Lab Results   Component Value Date    TRIG 124 12/22/2021    TRIG 147 01/09/2020    TRIG 130 10/02/2013     Lab Results   Component Value Date    HDL 49 12/22/2021    HDL 45 01/09/2020    HDL 41 10/02/2013     Lab Results   Component Value Date    LDLCALC 92 12/22/2021    LDLCALC 87 01/09/2020    LDLCALC 75 10/02/2013     Lab Results   Component Value Date    LABVLDL 25 12/22/2021    LABVLDL 29 01/09/2020    LABVLDL 26 10/02/2013     Lab Results   Component Value Date    LABA1C 7.3 08/18/2022       Assessment:     Donell Sandhu Kaveh Hernandez is a 71 y.o. female with :    1.T2DM:Longstanding, well controlled, insulin resistant. She was eating more CHO,  advised low CHo diet, continue levemir,may add GLP-1 agonist . A1c at goal-   2. HTN: BP slightly elevated  3. HLD:last LDL at goal  4. Obesity  5. Microalbuminuria: recheck elevated, on lisinopril  6. Neuropathy: states has pain at night,  started low dose neurontin but had side effect, discussed lyrica/amitryptiline, would like to wait  She was started back on it by PCP, working now  7. Hypercalcemia: Mild, PTH not completely suppressed, may have mild primary hyperparathyroidism. Nl  vitamin D. 8.Adrenal nodule: Will do hormonal testing, did not have done yet    Plan:      levemir 46/46   switch to NPH if levemir too costly for patient   Advise to check blood sugar 2-3 times a day   Patient to send blood sugar log for titration. Advise to exercise regularly but can not due to hip pain,Advise to low simple carbohydrate and protein with each  meal diet. 30gm with meal   Diabetes Care: recommend yearly eye exam, foot exam and urine microalbumin to   creatinine ratio.  Patient is up-to-date.       -Hyperlipidemia, LDL goal is <100 mg/dl   -Hypertension:Continue same  -Daily ASA:Takes  -Smoking status:Non smoker    4 months

## 2023-02-16 ENCOUNTER — TELEPHONE (OUTPATIENT)
Dept: ENDOCRINOLOGY | Age: 73
End: 2023-02-16

## 2023-02-16 DIAGNOSIS — E11.8 CONTROLLED TYPE 2 DIABETES MELLITUS WITH COMPLICATION, WITH LONG-TERM CURRENT USE OF INSULIN (HCC): Primary | ICD-10-CM

## 2023-02-16 DIAGNOSIS — Z79.4 CONTROLLED TYPE 2 DIABETES MELLITUS WITH COMPLICATION, WITH LONG-TERM CURRENT USE OF INSULIN (HCC): Primary | ICD-10-CM

## 2023-02-16 RX ORDER — PEN NEEDLE, DIABETIC 32GX 5/32"
NEEDLE, DISPOSABLE MISCELLANEOUS
Qty: 100 EACH | Refills: 4 | Status: SHIPPED | OUTPATIENT
Start: 2023-02-16

## 2023-02-16 NOTE — TELEPHONE ENCOUNTER
Patient is requesting a refill of her Insulin Pen Needle (BD PEN NEEDLE ELIZABETH 2ND GEN) 32G X 4 MM MIS sent to new pharmacy:        [facility]:    [address] [facility], [address] [place] [address]: [phone]

## 2023-02-16 NOTE — TELEPHONE ENCOUNTER
Medication:   Requested Prescriptions     Pending Prescriptions Disp Refills    Insulin Pen Needle (BD PEN NEEDLE ELIZABETH 2ND GEN) 32G X 4 MM MISC 100 each 4     Sig: USE 4 TIMESDAILY       Last Filled:      Patient Phone Number: [phone] (home)     Last appt: 12/15/22  Next appt: 4/27/23    Last Labs DM:   Lab Results   Component Value Date/Time    LABA1C 6.7 12/15/2022 04:09 PM

## 2023-04-27 DIAGNOSIS — E11.8 CONTROLLED TYPE 2 DIABETES MELLITUS WITH COMPLICATION, WITH LONG-TERM CURRENT USE OF INSULIN (HCC): ICD-10-CM

## 2023-04-27 DIAGNOSIS — Z79.4 CONTROLLED TYPE 2 DIABETES MELLITUS WITH COMPLICATION, WITH LONG-TERM CURRENT USE OF INSULIN (HCC): ICD-10-CM

## 2023-04-27 RX ORDER — INSULIN DETEMIR 100 [IU]/ML
INJECTION, SOLUTION SUBCUTANEOUS
Qty: 30 ML | Refills: 3 | Status: SHIPPED | OUTPATIENT
Start: 2023-04-27

## 2023-04-27 NOTE — TELEPHONE ENCOUNTER
Medication:   Requested Prescriptions     Pending Prescriptions Disp Refills    LEVEMIR FLEXTOUCH 100 UNIT/ML injection pen [Pharmacy Med Name: Lupe Green 100 UNIT/ML]  3     Sig: INJECT 46 UNITS SUBCUTANEOUSLY TWICE A DAY       Last Filled:      Patient Phone Number: [phone] (home)     Last appt: 12/15/2022   Next appt: 04/28/2023  Last Labs DM:   Lab Results   Component Value Date/Time    LABA1C 6.7 12/15/2022 04:09 PM

## 2023-04-28 ENCOUNTER — TELEMEDICINE (OUTPATIENT)
Dept: ENDOCRINOLOGY | Age: 73
End: 2023-04-28
Payer: MEDICARE

## 2023-04-28 DIAGNOSIS — E27.8 ADRENAL NODULE (HCC): ICD-10-CM

## 2023-04-28 DIAGNOSIS — Z79.4 CONTROLLED TYPE 2 DIABETES MELLITUS WITH COMPLICATION, WITH LONG-TERM CURRENT USE OF INSULIN (HCC): Primary | ICD-10-CM

## 2023-04-28 DIAGNOSIS — E11.8 CONTROLLED TYPE 2 DIABETES MELLITUS WITH COMPLICATION, WITH LONG-TERM CURRENT USE OF INSULIN (HCC): Primary | ICD-10-CM

## 2023-04-28 PROBLEM — E27.9 ADRENAL NODULE: Status: ACTIVE | Noted: 2023-04-28

## 2023-04-28 PROCEDURE — 1090F PRES/ABSN URINE INCON ASSESS: CPT | Performed by: INTERNAL MEDICINE

## 2023-04-28 PROCEDURE — 1124F ACP DISCUSS-NO DSCNMKR DOCD: CPT | Performed by: INTERNAL MEDICINE

## 2023-04-28 PROCEDURE — 3017F COLORECTAL CA SCREEN DOC REV: CPT | Performed by: INTERNAL MEDICINE

## 2023-04-28 PROCEDURE — 3046F HEMOGLOBIN A1C LEVEL >9.0%: CPT | Performed by: INTERNAL MEDICINE

## 2023-04-28 PROCEDURE — G8427 DOCREV CUR MEDS BY ELIG CLIN: HCPCS | Performed by: INTERNAL MEDICINE

## 2023-04-28 PROCEDURE — 2022F DILAT RTA XM EVC RTNOPTHY: CPT | Performed by: INTERNAL MEDICINE

## 2023-04-28 PROCEDURE — 99214 OFFICE O/P EST MOD 30 MIN: CPT | Performed by: INTERNAL MEDICINE

## 2023-04-28 PROCEDURE — G8399 PT W/DXA RESULTS DOCUMENT: HCPCS | Performed by: INTERNAL MEDICINE

## 2023-04-28 RX ORDER — TORSEMIDE 20 MG/1
20 TABLET ORAL DAILY
COMMUNITY

## 2023-04-28 RX ORDER — METOPROLOL SUCCINATE 25 MG/1
25 TABLET, EXTENDED RELEASE ORAL DAILY
COMMUNITY

## 2023-04-28 RX ORDER — HYDRALAZINE HYDROCHLORIDE 25 MG/1
25 TABLET, FILM COATED ORAL 3 TIMES DAILY
COMMUNITY

## 2023-04-28 NOTE — PROGRESS NOTES
Psychiatric:       [x] Normal Affect [x] No Hallucinations            Other pertinent observable physical exam findings-     Due to this being a TeleHealth encounter, evaluation of the following organ systems is limited: Vitals/Constitutional/EENT/Resp/CV/GI//MS/Neuro/Skin/Heme-Lymph-Imm. Services were provided through a video synchronous discussion virtually to substitute for in-person clinic visit. 12/21  Skeletal foot exam is normal, no skin lesions, toenails are normal,10 g monofilament is decreased    Lab Reviewed   No components found for: CHLPL  Lab Results   Component Value Date    TRIG 124 12/22/2021    TRIG 147 01/09/2020    TRIG 130 10/02/2013     Lab Results   Component Value Date    HDL 49 12/22/2021    HDL 45 01/09/2020    HDL 41 10/02/2013     Lab Results   Component Value Date    LDLCALC 92 12/22/2021    LDLCALC 87 01/09/2020    LDLCALC 75 10/02/2013     Lab Results   Component Value Date    LABVLDL 25 12/22/2021    LABVLDL 29 01/09/2020    LABVLDL 26 10/02/2013     Lab Results   Component Value Date    LABA1C 6.7 12/15/2022       Assessment:     Josefa Sellers is a 71 y.o. female with :    1.T2DM:Longstanding, well controlled, insulin resistant. She was eating more CHO,  advised low CHo diet, continue levemir,may add GLP-1 agonist . A1c at goal- Given occasional low, reduce levemir  2. HTN: BP slightly elevated  3. HLD:last LDL at goal  4. Obesity  5. Microalbuminuria: recheck elevated, on lisinopril  6. Neuropathy: states has pain at night,  started low dose neurontin but had side effect, discussed lyrica/amitryptiline, would like to wait  She was started back on it by PCP, working now  7. Hypercalcemia: Mild, PTH not completely suppressed, may have mild primary hyperparathyroidism. Nl  vitamin D. 8.Adrenal nodule:  MN, Cortisol urine normal . Aldosterone 104 renin 4, she is on aldactone, may need to repeat off of it.  Given recent HF, will hold on repeat testing, may do at next

## 2023-09-06 RX ORDER — BLOOD SUGAR DIAGNOSTIC
STRIP MISCELLANEOUS
Qty: 200 STRIP | Refills: 5 | Status: SHIPPED | OUTPATIENT
Start: 2023-09-06 | End: 2023-09-07 | Stop reason: SDUPTHER

## 2023-09-06 NOTE — TELEPHONE ENCOUNTER
Patient's spouse kaylin called requesting a refill     Rx- Verio Onetouch test strips     [address]- 4/28/23  NOV- 9/28/23    Please advise

## 2023-09-07 ENCOUNTER — TELEPHONE (OUTPATIENT)
Dept: ENDOCRINOLOGY | Age: 73
End: 2023-09-07

## 2023-09-07 DIAGNOSIS — Z79.4 CONTROLLED TYPE 2 DIABETES MELLITUS WITHOUT COMPLICATION, WITH LONG-TERM CURRENT USE OF INSULIN (HCC): Primary | ICD-10-CM

## 2023-09-07 DIAGNOSIS — E11.9 CONTROLLED TYPE 2 DIABETES MELLITUS WITHOUT COMPLICATION, WITH LONG-TERM CURRENT USE OF INSULIN (HCC): Primary | ICD-10-CM

## 2023-09-07 RX ORDER — BLOOD SUGAR DIAGNOSTIC
STRIP MISCELLANEOUS
Qty: 200 STRIP | Refills: 5 | Status: SHIPPED | OUTPATIENT
Start: 2023-09-07

## 2023-09-07 NOTE — TELEPHONE ENCOUNTER
Pharmacy called and needs dx code included on the rx for     blood glucose test strips (ONETOUCH VERIO) strip   They are requesting a new script be sent     [id number], [address] [place] [address],[address] Mar[address]ie RiveraRodriguez.  [place] [phone] Neha De La Rosa [phone]   [address] [place] [address]   Phone:  [phone]  Fax:  [phone]

## 2023-09-21 DIAGNOSIS — Z79.4 CONTROLLED TYPE 2 DIABETES MELLITUS WITH COMPLICATION, WITH LONG-TERM CURRENT USE OF INSULIN (HCC): ICD-10-CM

## 2023-09-21 DIAGNOSIS — E11.8 CONTROLLED TYPE 2 DIABETES MELLITUS WITH COMPLICATION, WITH LONG-TERM CURRENT USE OF INSULIN (HCC): ICD-10-CM

## 2023-09-21 NOTE — TELEPHONE ENCOUNTER
Medication:   Requested Prescriptions     Pending Prescriptions Disp Refills    insulin detemir (LEVEMIR FLEXPEN) 100 UNIT/ML injection pen [Pharmacy Med Name: Tenzin Butler 100 UNIT/ML]  3     Sig: INJECT 46 UNITS SUBCUTANEOUSLY TWICE A DAY       Last Filled:      Patient Phone Number: [phone] (home)     Last appt: 12/15/2022   Next appt: 9/28/2023    Last Labs DM:   Lab Results   Component Value Date/Time    LABA1C 6.7 12/15/2022 04:09 PM

## 2023-09-28 ENCOUNTER — OFFICE VISIT (OUTPATIENT)
Dept: ENDOCRINOLOGY | Age: 73
End: 2023-09-28
Payer: MEDICARE

## 2023-09-28 VITALS
HEIGHT: 65 IN | RESPIRATION RATE: 16 BRPM | TEMPERATURE: 98 F | SYSTOLIC BLOOD PRESSURE: 132 MMHG | OXYGEN SATURATION: 93 % | WEIGHT: 261 LBS | DIASTOLIC BLOOD PRESSURE: 68 MMHG | BODY MASS INDEX: 43.49 KG/M2 | HEART RATE: 71 BPM

## 2023-09-28 DIAGNOSIS — E66.01 OBESITY, CLASS III, BMI 40-49.9 (MORBID OBESITY) (HCC): ICD-10-CM

## 2023-09-28 DIAGNOSIS — E11.8 CONTROLLED TYPE 2 DIABETES MELLITUS WITH COMPLICATION, WITH LONG-TERM CURRENT USE OF INSULIN (HCC): Primary | ICD-10-CM

## 2023-09-28 DIAGNOSIS — Z79.4 CONTROLLED TYPE 2 DIABETES MELLITUS WITH COMPLICATION, WITH LONG-TERM CURRENT USE OF INSULIN (HCC): Primary | ICD-10-CM

## 2023-09-28 LAB — HBA1C MFR BLD: 5.9 %

## 2023-09-28 PROCEDURE — 3075F SYST BP GE 130 - 139MM HG: CPT | Performed by: INTERNAL MEDICINE

## 2023-09-28 PROCEDURE — 3078F DIAST BP <80 MM HG: CPT | Performed by: INTERNAL MEDICINE

## 2023-09-28 PROCEDURE — 2022F DILAT RTA XM EVC RTNOPTHY: CPT | Performed by: INTERNAL MEDICINE

## 2023-09-28 PROCEDURE — 1090F PRES/ABSN URINE INCON ASSESS: CPT | Performed by: INTERNAL MEDICINE

## 2023-09-28 PROCEDURE — 83036 HEMOGLOBIN GLYCOSYLATED A1C: CPT | Performed by: INTERNAL MEDICINE

## 2023-09-28 PROCEDURE — G8417 CALC BMI ABV UP PARAM F/U: HCPCS | Performed by: INTERNAL MEDICINE

## 2023-09-28 PROCEDURE — 99214 OFFICE O/P EST MOD 30 MIN: CPT | Performed by: INTERNAL MEDICINE

## 2023-09-28 PROCEDURE — 3017F COLORECTAL CA SCREEN DOC REV: CPT | Performed by: INTERNAL MEDICINE

## 2023-09-28 PROCEDURE — 3046F HEMOGLOBIN A1C LEVEL >9.0%: CPT | Performed by: INTERNAL MEDICINE

## 2023-09-28 PROCEDURE — G8399 PT W/DXA RESULTS DOCUMENT: HCPCS | Performed by: INTERNAL MEDICINE

## 2023-09-28 PROCEDURE — 1124F ACP DISCUSS-NO DSCNMKR DOCD: CPT | Performed by: INTERNAL MEDICINE

## 2023-09-28 PROCEDURE — G8427 DOCREV CUR MEDS BY ELIG CLIN: HCPCS | Performed by: INTERNAL MEDICINE

## 2023-09-28 PROCEDURE — 1036F TOBACCO NON-USER: CPT | Performed by: INTERNAL MEDICINE

## 2024-02-08 ENCOUNTER — OFFICE VISIT (OUTPATIENT)
Dept: ENDOCRINOLOGY | Age: 74
End: 2024-02-08
Payer: MEDICARE

## 2024-02-08 VITALS
HEART RATE: 54 BPM | RESPIRATION RATE: 15 BRPM | WEIGHT: 262 LBS | TEMPERATURE: 98 F | OXYGEN SATURATION: 92 % | BODY MASS INDEX: 43.65 KG/M2 | DIASTOLIC BLOOD PRESSURE: 74 MMHG | HEIGHT: 65 IN | SYSTOLIC BLOOD PRESSURE: 126 MMHG

## 2024-02-08 DIAGNOSIS — Z79.4 CONTROLLED TYPE 2 DIABETES MELLITUS WITH COMPLICATION, WITH LONG-TERM CURRENT USE OF INSULIN (HCC): Primary | ICD-10-CM

## 2024-02-08 DIAGNOSIS — E27.8 ADRENAL NODULE (HCC): ICD-10-CM

## 2024-02-08 DIAGNOSIS — E11.8 CONTROLLED TYPE 2 DIABETES MELLITUS WITH COMPLICATION, WITH LONG-TERM CURRENT USE OF INSULIN (HCC): Primary | ICD-10-CM

## 2024-02-08 PROBLEM — L97.922 NON-PRESSURE CHRONIC ULCER OF UNSPECIFIED PART OF LEFT LOWER LEG WITH FAT LAYER EXPOSED (HCC): Status: ACTIVE | Noted: 2024-02-08

## 2024-02-08 LAB — HBA1C MFR BLD: 6.2 %

## 2024-02-08 PROCEDURE — 99214 OFFICE O/P EST MOD 30 MIN: CPT | Performed by: INTERNAL MEDICINE

## 2024-02-08 PROCEDURE — 83036 HEMOGLOBIN GLYCOSYLATED A1C: CPT | Performed by: INTERNAL MEDICINE

## 2024-02-08 PROCEDURE — G8417 CALC BMI ABV UP PARAM F/U: HCPCS | Performed by: INTERNAL MEDICINE

## 2024-02-08 PROCEDURE — 3078F DIAST BP <80 MM HG: CPT | Performed by: INTERNAL MEDICINE

## 2024-02-08 PROCEDURE — G8484 FLU IMMUNIZE NO ADMIN: HCPCS | Performed by: INTERNAL MEDICINE

## 2024-02-08 PROCEDURE — 1036F TOBACCO NON-USER: CPT | Performed by: INTERNAL MEDICINE

## 2024-02-08 PROCEDURE — 3046F HEMOGLOBIN A1C LEVEL >9.0%: CPT | Performed by: INTERNAL MEDICINE

## 2024-02-08 PROCEDURE — 1124F ACP DISCUSS-NO DSCNMKR DOCD: CPT | Performed by: INTERNAL MEDICINE

## 2024-02-08 PROCEDURE — 3074F SYST BP LT 130 MM HG: CPT | Performed by: INTERNAL MEDICINE

## 2024-02-08 PROCEDURE — 3017F COLORECTAL CA SCREEN DOC REV: CPT | Performed by: INTERNAL MEDICINE

## 2024-02-08 PROCEDURE — G8399 PT W/DXA RESULTS DOCUMENT: HCPCS | Performed by: INTERNAL MEDICINE

## 2024-02-08 PROCEDURE — 1090F PRES/ABSN URINE INCON ASSESS: CPT | Performed by: INTERNAL MEDICINE

## 2024-02-08 PROCEDURE — G8427 DOCREV CUR MEDS BY ELIG CLIN: HCPCS | Performed by: INTERNAL MEDICINE

## 2024-02-08 PROCEDURE — 2022F DILAT RTA XM EVC RTNOPTHY: CPT | Performed by: INTERNAL MEDICINE

## 2024-02-08 RX ORDER — INSULIN GLARGINE 100 [IU]/ML
INJECTION, SOLUTION SUBCUTANEOUS
Qty: 5 ADJUSTABLE DOSE PRE-FILLED PEN SYRINGE | Refills: 5 | Status: SHIPPED | OUTPATIENT
Start: 2024-02-08

## 2024-02-08 NOTE — PROGRESS NOTES
Seen as f/u patient for diabetes       Interim;     On oxygen  Stable    MRI 3/22 showed right 2.4cm adrenal adenoma, stable  Left 1.3cm seen on previous CT  2017 CT no adrenal nodule      She had hypercalcemia noted on labs  10/20 Ca 10.0 PTH 56  2/21 Ca 10.7     She is off MVT now    Diagnosed with Type 2 diabetes mellitus > 5   years  Known diabetic complications: none     Current diabetic medications   Levemir 36 units  Jardiance    Metformin ER 1gm BID- not taking    Moderate, controlled, no worsening factors    Intolerance to diabetes medications: yes - Metformin -bad taste in mouth    Last A1c 6.2%<-----5.9%<----6.7%<-----7.3%,-----6.7%<-----6.8%<-----6.9%<---- 6.5%<-----6.7%<------7%<-----7.2%<------- 6.3%<------5.5%<------- 8.1 on 4/17<------6.9 on 7/16<------- 6.9 on 4/16<-----5.9 on 1/16<----- 6.4 on 10/15<------ 6.9 on 6/15<-----5.9 on 2/15<----7.4 on 10/13<---- 8.8 on 5/13<---9.2<---9.6    Prior visit with dietician: No  Current diet: on average, 3 meals per day trying to watch  Current exercise: walking   Current monitoring regimen: home blood tests - 1-2 times daily     Has brought blood glucose log/meter: no  Home blood sugar records:100s  Any episodes of hypoglycemia 58    No Hx of CAD , PVD, CVA    Hyperlipidemia:for years, controlled  simvastatin 20mg  LDL 74 on 3/14  Gemfibrozil 600 BID  LDL 87 on 1/20  Last eye exam: 5/23  Last foot exam:12/21    She had some microalbuminuria  Last microalbumin to creatinine ratio: 53.8 on 6/14---> 9/14 nl, 2/16 nl---> 34.9 on 9/17---> 139 on 12/18---> 139 on 12/19---> 154.2 on 2/21---> 187 on 4/23    HTN: For years, taking medication, stable  norvasc 5 BID  lisinopril 40mg aldactone 25 clonidine 0.1 BID     Past Medical History:   Diagnosis Date    Arthritis     Chronic kidney disease (CKD) stage G3a/A3, moderately decreased glomerular filtration rate (GFR) between 45-59 mL/min/1.73 square meter and albuminuria creatinine ratio greater than 300 mg/g (HCC)

## 2024-02-20 DIAGNOSIS — E11.8 CONTROLLED TYPE 2 DIABETES MELLITUS WITH COMPLICATION, WITH LONG-TERM CURRENT USE OF INSULIN (HCC): ICD-10-CM

## 2024-02-20 DIAGNOSIS — Z79.4 CONTROLLED TYPE 2 DIABETES MELLITUS WITH COMPLICATION, WITH LONG-TERM CURRENT USE OF INSULIN (HCC): ICD-10-CM

## 2024-02-22 NOTE — TELEPHONE ENCOUNTER
Medication:   Requested Prescriptions     Pending Prescriptions Disp Refills    insulin detemir (LEVEMIR FLEXPEN) 100 UNIT/ML injection pen [Pharmacy Med Name: LEVEMIR FLEXPEN 100 UNIT/ML]  3     Sig: INJECT 46 UNITS SUBCUTANEOUSLY TWICE A DAY       Last Filled:      Patient Phone Number: [phone] (home)     Last appt: 2/8/2024   Next appt: 8/8/2024    Last Labs DM:   Lab Results   Component Value Date/Time    LABA1C 6.2 02/08/2024 01:25 PM

## 2024-08-08 ENCOUNTER — OFFICE VISIT (OUTPATIENT)
Dept: ENDOCRINOLOGY | Age: 74
End: 2024-08-08

## 2024-08-08 VITALS
OXYGEN SATURATION: 95 % | BODY MASS INDEX: 44.98 KG/M2 | WEIGHT: 270 LBS | DIASTOLIC BLOOD PRESSURE: 80 MMHG | RESPIRATION RATE: 16 BRPM | HEART RATE: 55 BPM | SYSTOLIC BLOOD PRESSURE: 126 MMHG | HEIGHT: 65 IN

## 2024-08-08 DIAGNOSIS — E11.9 CONTROLLED TYPE 2 DIABETES MELLITUS WITHOUT COMPLICATION, WITH LONG-TERM CURRENT USE OF INSULIN (HCC): ICD-10-CM

## 2024-08-08 DIAGNOSIS — Z79.4 CONTROLLED TYPE 2 DIABETES MELLITUS WITHOUT COMPLICATION, WITH LONG-TERM CURRENT USE OF INSULIN (HCC): ICD-10-CM

## 2024-08-08 LAB — HBA1C MFR BLD: 6.4 %

## 2024-08-08 RX ORDER — PEN NEEDLE, DIABETIC 32GX 5/32"
NEEDLE, DISPOSABLE MISCELLANEOUS
Qty: 100 EACH | Refills: 4 | Status: SHIPPED | OUTPATIENT
Start: 2024-08-08

## 2024-08-08 RX ORDER — BLOOD SUGAR DIAGNOSTIC
STRIP MISCELLANEOUS
Qty: 200 STRIP | Refills: 5 | Status: SHIPPED | OUTPATIENT
Start: 2024-08-08

## 2024-08-08 NOTE — PROGRESS NOTES
Seen as f/u patient for diabetes       Interim;     On oxygen  Stable    MRI 3/22 showed right 2.4cm adrenal adenoma, stable  Left 1.3cm seen on previous CT  2017 CT no adrenal nodule      She had hypercalcemia noted on labs  10/20 Ca 10.0 PTH 56  2/21 Ca 10.7     She is off MVT now    Diagnosed with Type 2 diabetes mellitus > 5   years  Known diabetic complications: none     Current diabetic medications   Lantus 36 units  Jardiance    Metformin ER 1gm BID- not taking    Moderate, controlled, no worsening factors    Intolerance to diabetes medications: yes - Metformin -bad taste in mouth    Last A1c 6.4%<-----6.2%<-----5.9%<----6.7%<-----7.3%,-----6.7%<-----6.8%<-----6.9%<---- 6.5%<-----6.7%<------7%<-----7.2%<------- 6.3%<------5.5%<------- 8.1 on 4/17<------6.9 on 7/16<------- 6.9 on 4/16<-----5.9 on 1/16<----- 6.4 on 10/15<------ 6.9 on 6/15<-----5.9 on 2/15<----7.4 on 10/13<---- 8.8 on 5/13<---9.2<---9.6    Prior visit with dietician: No  Current diet: on average, 3 meals per day trying to watch  Current exercise: walking   Current monitoring regimen: home blood tests - 1-2 times daily     Has brought blood glucose log/meter: no  Home blood sugar records:100s  Any episodes of hypoglycemia 58    No Hx of CAD , PVD, CVA    Hyperlipidemia:for years, controlled  simvastatin 20mg  LDL 74 on 3/14  Gemfibrozil 600 BID  LDL 87 on 1/20  Last eye exam: 5/23  Last foot exam:12/21  she has stocking on today, deferred    She had some microalbuminuria  Last microalbumin to creatinine ratio: 53.8 on 6/14---> 9/14 nl, 2/16 nl---> 34.9 on 9/17---> 139 on 12/18---> 139 on 12/19---> 154.2 on 2/21---> 187 on 4/23  Sees nephrology    HTN: For years, taking medication, stable  norvasc 5 BID  lisinopril 40mg aldactone 25 clonidine 0.1 BID     Past Medical History:   Diagnosis Date    Arthritis     Chronic kidney disease (CKD) stage G3a/A3, moderately decreased glomerular filtration rate (GFR) between 45-59 mL/min/1.73 square meter and

## 2025-05-08 ENCOUNTER — OFFICE VISIT (OUTPATIENT)
Dept: ENDOCRINOLOGY | Age: 75
End: 2025-05-08
Payer: MEDICARE

## 2025-05-08 VITALS
HEART RATE: 61 BPM | DIASTOLIC BLOOD PRESSURE: 64 MMHG | HEIGHT: 65 IN | BODY MASS INDEX: 40.59 KG/M2 | WEIGHT: 243.6 LBS | SYSTOLIC BLOOD PRESSURE: 132 MMHG

## 2025-05-08 DIAGNOSIS — E11.9 CONTROLLED TYPE 2 DIABETES MELLITUS WITHOUT COMPLICATION, WITH LONG-TERM CURRENT USE OF INSULIN (HCC): Primary | ICD-10-CM

## 2025-05-08 DIAGNOSIS — Z79.4 CONTROLLED TYPE 2 DIABETES MELLITUS WITHOUT COMPLICATION, WITH LONG-TERM CURRENT USE OF INSULIN (HCC): Primary | ICD-10-CM

## 2025-05-08 DIAGNOSIS — I10 ESSENTIAL HYPERTENSION: Chronic | ICD-10-CM

## 2025-05-08 LAB — HBA1C MFR BLD: 5.9 %

## 2025-05-08 PROCEDURE — 3075F SYST BP GE 130 - 139MM HG: CPT | Performed by: INTERNAL MEDICINE

## 2025-05-08 PROCEDURE — 99214 OFFICE O/P EST MOD 30 MIN: CPT | Performed by: INTERNAL MEDICINE

## 2025-05-08 PROCEDURE — 3044F HG A1C LEVEL LT 7.0%: CPT | Performed by: INTERNAL MEDICINE

## 2025-05-08 PROCEDURE — 1036F TOBACCO NON-USER: CPT | Performed by: INTERNAL MEDICINE

## 2025-05-08 PROCEDURE — 1124F ACP DISCUSS-NO DSCNMKR DOCD: CPT | Performed by: INTERNAL MEDICINE

## 2025-05-08 PROCEDURE — 1090F PRES/ABSN URINE INCON ASSESS: CPT | Performed by: INTERNAL MEDICINE

## 2025-05-08 PROCEDURE — G8427 DOCREV CUR MEDS BY ELIG CLIN: HCPCS | Performed by: INTERNAL MEDICINE

## 2025-05-08 PROCEDURE — G8417 CALC BMI ABV UP PARAM F/U: HCPCS | Performed by: INTERNAL MEDICINE

## 2025-05-08 PROCEDURE — 83036 HEMOGLOBIN GLYCOSYLATED A1C: CPT | Performed by: INTERNAL MEDICINE

## 2025-05-08 PROCEDURE — G2211 COMPLEX E/M VISIT ADD ON: HCPCS | Performed by: INTERNAL MEDICINE

## 2025-05-08 PROCEDURE — 3078F DIAST BP <80 MM HG: CPT | Performed by: INTERNAL MEDICINE

## 2025-05-08 PROCEDURE — 2022F DILAT RTA XM EVC RTNOPTHY: CPT | Performed by: INTERNAL MEDICINE

## 2025-05-08 PROCEDURE — 3017F COLORECTAL CA SCREEN DOC REV: CPT | Performed by: INTERNAL MEDICINE

## 2025-05-08 PROCEDURE — G8399 PT W/DXA RESULTS DOCUMENT: HCPCS | Performed by: INTERNAL MEDICINE

## 2025-05-08 PROCEDURE — 1159F MED LIST DOCD IN RCRD: CPT | Performed by: INTERNAL MEDICINE

## 2025-05-08 RX ORDER — INSULIN GLARGINE 100 [IU]/ML
INJECTION, SOLUTION SUBCUTANEOUS
Qty: 5 ADJUSTABLE DOSE PRE-FILLED PEN SYRINGE | Refills: 5 | Status: SHIPPED | OUTPATIENT
Start: 2025-05-08

## 2025-05-08 NOTE — PROGRESS NOTES
no swelling  Skin: No obvious rashes or lesions present.  Skin and hair texture normal  Psychiatric: Judgement and Insight:  judgement and insight appear normal  Neuro: Normal without focal findings, speech is normal normal, speech is spontaneous  Chest: No labored breathing, no chest deformity, no stridor  Musculoskeletal: No joint deformity, swelling      12/21  Skeletal foot exam is normal, no skin lesions, toenails are normal,10 g monofilament is decreased    Lab Reviewed   No components found for: \"CHLPL\"  Lab Results   Component Value Date    TRIG 124 12/22/2021    TRIG 147 01/09/2020    TRIG 130 10/02/2013     Lab Results   Component Value Date    HDL 49 12/22/2021    HDL 45 01/09/2020    HDL 41 10/02/2013     No components found for: \"LDLCALC\"    No components found for: \"LABVLDL\"    Lab Results   Component Value Date    LABA1C 6.4 08/08/2024       Assessment:     Neena Delgado is a 74 y.o. female with :    1.T2DM:Longstanding, well controlled, insulin resistant.She was eating more CHO,  advised low CHo diet, continue levemir,may add GLP-1 agonist but states costly. A1c at goal- reduce insulin dose  2.HTN: BP at goal  3.HLD:last LDL at goal  4.Obesity  5.Microalbuminuria: recheck elevated, on lisinopril. Seeing nephrology  6.Neuropathy: states has pain at night,  started low dose neurontin but had side effect, discussed lyrica/amitryptiline, would like to wait  She was started back on it by PCP, working now  7.Hypercalcemia: Mild, PTH not completely suppressed, may have mild primary hyperparathyroidism. Nl  vitamin D.  8.Adrenal nodule:  MN, Cortisol urine normal . Aldosterone 104 renin 4, she is on aldactone, repeat off of it. Ordered labs,repeat not done. MN and cortisol normal.   9.Tremors:       Plan:       lantus   26----> 20  units   Advise to check blood sugar 2-3 times a day   Patient to send blood sugar log for titration.   Advise to exercise regularly but can not due to hip pain,Advise to low

## 2025-05-09 RX ORDER — INSULIN GLARGINE 100 [IU]/ML
INJECTION, SOLUTION SUBCUTANEOUS
Qty: 5 ADJUSTABLE DOSE PRE-FILLED PEN SYRINGE | Refills: 3 | Status: SHIPPED | OUTPATIENT
Start: 2025-05-09

## 2025-06-22 DIAGNOSIS — Z79.4 CONTROLLED TYPE 2 DIABETES MELLITUS WITHOUT COMPLICATION, WITH LONG-TERM CURRENT USE OF INSULIN (HCC): Primary | ICD-10-CM

## 2025-06-22 DIAGNOSIS — E11.9 CONTROLLED TYPE 2 DIABETES MELLITUS WITHOUT COMPLICATION, WITH LONG-TERM CURRENT USE OF INSULIN (HCC): Primary | ICD-10-CM

## 2025-06-23 RX ORDER — PEN NEEDLE, DIABETIC 32GX 5/32"
NEEDLE, DISPOSABLE MISCELLANEOUS
Qty: 100 EACH | Refills: 4 | Status: SHIPPED | OUTPATIENT
Start: 2025-06-23

## 2025-06-23 NOTE — TELEPHONE ENCOUNTER
Medication:   Requested Prescriptions     Pending Prescriptions Disp Refills    BD PEN NEEDLE ELIZABETH 2ND GEN 32G X 4 MM MISC [Pharmacy Med Name: BD ELIZABETH 2 GEN PEN NDL 32G 4MM]  4     Sig: USE 4 TIMESDAILY       Last Filled:      Patient Phone Number: [phone] (home)     Last appt: 5/8/2025   Next appt: 9/11/2025    Last Labs DM:   Lab Results   Component Value Date/Time    LABA1C 5.9 05/08/2025 04:15 PM

## (undated) DEVICE — CONTAINER SPEC 480ML CLR POLYSTYR 10% NEUT BUFF FRMLN ZN

## (undated) DEVICE — SNARE ENDOSCP 11 MM BRAIDED WIRE CAPTFLX DISP

## (undated) DEVICE — ENDOSCOPY KIT: Brand: MEDLINE INDUSTRIES, INC.

## (undated) DEVICE — ELECTRODE PT RET AD L9FT HI MOIST COND ADH HYDRGEL CORDED

## (undated) DEVICE — TRAP POLYP ETRAP